# Patient Record
Sex: FEMALE | Race: WHITE | Employment: OTHER | URBAN - METROPOLITAN AREA
[De-identification: names, ages, dates, MRNs, and addresses within clinical notes are randomized per-mention and may not be internally consistent; named-entity substitution may affect disease eponyms.]

---

## 2017-10-06 PROBLEM — H91.93 BILATERAL HEARING LOSS: Chronic | Status: ACTIVE | Noted: 2017-10-06

## 2017-10-06 PROBLEM — E11.36 TYPE 2 DIABETES MELLITUS WITH DIABETIC CATARACT, WITHOUT LONG-TERM CURRENT USE OF INSULIN (HCC): Chronic | Status: ACTIVE | Noted: 2017-10-06

## 2017-10-06 PROBLEM — F51.01 PRIMARY INSOMNIA: Chronic | Status: ACTIVE | Noted: 2017-10-06

## 2017-10-06 PROBLEM — F41.9 ANXIETY: Chronic | Status: ACTIVE | Noted: 2017-10-06

## 2017-12-01 ENCOUNTER — HOSPITAL ENCOUNTER (OUTPATIENT)
Dept: MRI IMAGING | Age: 82
Discharge: HOME | End: 2017-12-01
Attending: OTOLARYNGOLOGY
Payer: MEDICARE

## 2017-12-01 VITALS — WEIGHT: 126 LBS | BODY MASS INDEX: 23.42 KG/M2

## 2017-12-01 DIAGNOSIS — H91.23 SUDDEN HEARING LOSS OF BOTH EARS: ICD-10-CM

## 2017-12-01 LAB — CREAT BLD-MCNC: 1.1 MG/DL (ref 0.8–1.5)

## 2017-12-01 PROCEDURE — 70553 MRI BRAIN STEM W/O & W/DYE: CPT

## 2017-12-01 PROCEDURE — 74011250636 HC RX REV CODE- 250/636: Performed by: OTOLARYNGOLOGY

## 2017-12-01 PROCEDURE — A9577 INJ MULTIHANCE: HCPCS | Performed by: OTOLARYNGOLOGY

## 2017-12-01 PROCEDURE — 82565 ASSAY OF CREATININE: CPT

## 2017-12-01 RX ORDER — SODIUM CHLORIDE 0.9 % (FLUSH) 0.9 %
10 SYRINGE (ML) INJECTION
Status: COMPLETED | OUTPATIENT
Start: 2017-12-01 | End: 2017-12-01

## 2017-12-01 RX ADMIN — Medication 10 ML: at 16:20

## 2017-12-01 RX ADMIN — GADOBENATE DIMEGLUMINE 12 ML: 529 INJECTION, SOLUTION INTRAVENOUS at 16:20

## 2018-04-03 PROBLEM — E11.21 TYPE 2 DIABETES WITH NEPHROPATHY (HCC): Chronic | Status: ACTIVE | Noted: 2018-04-03

## 2018-04-03 PROBLEM — F32.1 MODERATE MAJOR DEPRESSION (HCC): Chronic | Status: ACTIVE | Noted: 2018-04-03

## 2018-04-03 PROBLEM — F32.1 MODERATE MAJOR DEPRESSION (HCC): Status: ACTIVE | Noted: 2018-04-03

## 2018-04-03 PROBLEM — E11.21 TYPE 2 DIABETES WITH NEPHROPATHY (HCC): Status: ACTIVE | Noted: 2018-04-03

## 2020-09-30 PROBLEM — E11.36 TYPE 2 DIABETES MELLITUS WITH DIABETIC CATARACT, WITHOUT LONG-TERM CURRENT USE OF INSULIN (HCC): Chronic | Status: RESOLVED | Noted: 2017-10-06 | Resolved: 2020-09-30

## 2021-04-14 PROBLEM — E11.21 TYPE 2 DIABETES WITH NEPHROPATHY (HCC): Chronic | Status: RESOLVED | Noted: 2018-04-03 | Resolved: 2021-04-14

## 2022-03-19 PROBLEM — F51.01 PRIMARY INSOMNIA: Status: ACTIVE | Noted: 2017-10-06

## 2022-03-19 PROBLEM — H91.93 BILATERAL HEARING LOSS: Status: ACTIVE | Noted: 2017-10-06

## 2022-03-19 PROBLEM — F32.1 MODERATE MAJOR DEPRESSION (HCC): Status: ACTIVE | Noted: 2018-04-03

## 2022-03-20 PROBLEM — F41.9 ANXIETY: Status: ACTIVE | Noted: 2017-10-06

## 2022-06-09 ENCOUNTER — PROCEDURE VISIT (OUTPATIENT)
Dept: ENT CLINIC | Age: 87
End: 2022-06-09

## 2022-06-09 DIAGNOSIS — Z97.4 USES HEARING AID: Primary | ICD-10-CM

## 2022-06-09 NOTE — PROGRESS NOTES
Patient's daughter brought in the patient's hearing aids stating they are not working. Cleaned and checked both aids. Changed the wax filter and cleaned the mics on both aids. A listening check revealed that the aids are functioning correctly at this time. There were no other concerns reported at this time.    Elliott Miles, Misty CCC-A

## 2022-10-20 DIAGNOSIS — I10 ESSENTIAL (PRIMARY) HYPERTENSION: ICD-10-CM

## 2022-10-20 RX ORDER — ATENOLOL 25 MG/1
TABLET ORAL
Qty: 90 TABLET | Refills: 1 | OUTPATIENT
Start: 2022-10-20

## 2022-10-24 ENCOUNTER — NURSE ONLY (OUTPATIENT)
Dept: FAMILY MEDICINE CLINIC | Facility: CLINIC | Age: 87
End: 2022-10-24

## 2022-10-24 DIAGNOSIS — E11.9 TYPE 2 DIABETES MELLITUS WITHOUT COMPLICATION, UNSPECIFIED WHETHER LONG TERM INSULIN USE (HCC): Primary | ICD-10-CM

## 2022-10-24 DIAGNOSIS — E11.9 TYPE 2 DIABETES MELLITUS WITHOUT COMPLICATION, UNSPECIFIED WHETHER LONG TERM INSULIN USE (HCC): ICD-10-CM

## 2022-10-24 LAB
BASOPHILS # BLD: 0.1 K/UL (ref 0–0.2)
BASOPHILS NFR BLD: 1 % (ref 0–2)
DIFFERENTIAL METHOD BLD: ABNORMAL
EOSINOPHIL # BLD: 0.1 K/UL (ref 0–0.8)
EOSINOPHIL NFR BLD: 3 % (ref 0.5–7.8)
ERYTHROCYTE [DISTWIDTH] IN BLOOD BY AUTOMATED COUNT: 13 % (ref 11.9–14.6)
EST. AVERAGE GLUCOSE BLD GHB EST-MCNC: 131 MG/DL
HBA1C MFR BLD: 6.2 % (ref 4.8–5.6)
HCT VFR BLD AUTO: 46.1 % (ref 35.8–46.3)
HGB BLD-MCNC: 15.2 G/DL (ref 11.7–15.4)
IMM GRANULOCYTES # BLD AUTO: 0 K/UL (ref 0–0.5)
IMM GRANULOCYTES NFR BLD AUTO: 1 % (ref 0–5)
LYMPHOCYTES # BLD: 1 K/UL (ref 0.5–4.6)
LYMPHOCYTES NFR BLD: 20 % (ref 13–44)
MCH RBC QN AUTO: 30.3 PG (ref 26.1–32.9)
MCHC RBC AUTO-ENTMCNC: 33 G/DL (ref 31.4–35)
MCV RBC AUTO: 92 FL (ref 82–102)
MONOCYTES # BLD: 0.7 K/UL (ref 0.1–1.3)
MONOCYTES NFR BLD: 13 % (ref 4–12)
NEUTS SEG # BLD: 3.3 K/UL (ref 1.7–8.2)
NEUTS SEG NFR BLD: 62 % (ref 43–78)
NRBC # BLD: 0 K/UL (ref 0–0.2)
PLATELET # BLD AUTO: 137 K/UL (ref 150–450)
PMV BLD AUTO: 10.2 FL (ref 9.4–12.3)
RBC # BLD AUTO: 5.01 M/UL (ref 4.05–5.2)
WBC # BLD AUTO: 5.1 K/UL (ref 4.3–11.1)

## 2022-10-25 LAB
ALBUMIN SERPL-MCNC: 4.1 G/DL (ref 3.2–4.6)
ALBUMIN/GLOB SERPL: 1.4 {RATIO} (ref 0.4–1.6)
ALP SERPL-CCNC: 68 U/L (ref 50–136)
ALT SERPL-CCNC: 24 U/L (ref 12–65)
ANION GAP SERPL CALC-SCNC: 6 MMOL/L (ref 2–11)
AST SERPL-CCNC: 18 U/L (ref 15–37)
BILIRUB SERPL-MCNC: 0.9 MG/DL (ref 0.2–1.1)
BUN SERPL-MCNC: 20 MG/DL (ref 8–23)
CALCIUM SERPL-MCNC: 9.8 MG/DL (ref 8.3–10.4)
CHLORIDE SERPL-SCNC: 103 MMOL/L (ref 101–110)
CHOLEST SERPL-MCNC: 102 MG/DL
CO2 SERPL-SCNC: 30 MMOL/L (ref 21–32)
CREAT SERPL-MCNC: 1.1 MG/DL (ref 0.6–1)
GLOBULIN SER CALC-MCNC: 2.9 G/DL (ref 2.8–4.5)
GLUCOSE SERPL-MCNC: 131 MG/DL (ref 65–100)
HDLC SERPL-MCNC: 37 MG/DL (ref 40–60)
HDLC SERPL: 2.8 {RATIO}
LDLC SERPL CALC-MCNC: 21.2 MG/DL
POTASSIUM SERPL-SCNC: 3.3 MMOL/L (ref 3.5–5.1)
PROT SERPL-MCNC: 7 G/DL (ref 6.3–8.2)
SODIUM SERPL-SCNC: 139 MMOL/L (ref 133–143)
TRIGL SERPL-MCNC: 219 MG/DL (ref 35–150)
VLDLC SERPL CALC-MCNC: 43.8 MG/DL (ref 6–23)

## 2022-10-27 ENCOUNTER — OFFICE VISIT (OUTPATIENT)
Dept: FAMILY MEDICINE CLINIC | Facility: CLINIC | Age: 87
End: 2022-10-27
Payer: MEDICARE

## 2022-10-27 VITALS
TEMPERATURE: 97 F | BODY MASS INDEX: 25.03 KG/M2 | HEART RATE: 76 BPM | OXYGEN SATURATION: 95 % | HEIGHT: 62 IN | SYSTOLIC BLOOD PRESSURE: 122 MMHG | WEIGHT: 136 LBS | DIASTOLIC BLOOD PRESSURE: 80 MMHG

## 2022-10-27 DIAGNOSIS — N18.30 STAGE 3 CHRONIC KIDNEY DISEASE, UNSPECIFIED WHETHER STAGE 3A OR 3B CKD (HCC): ICD-10-CM

## 2022-10-27 DIAGNOSIS — I10 ESSENTIAL (PRIMARY) HYPERTENSION: ICD-10-CM

## 2022-10-27 DIAGNOSIS — E11.9 TYPE 2 DIABETES MELLITUS WITHOUT COMPLICATION, UNSPECIFIED WHETHER LONG TERM INSULIN USE (HCC): Primary | ICD-10-CM

## 2022-10-27 DIAGNOSIS — F51.01 PRIMARY INSOMNIA: ICD-10-CM

## 2022-10-27 DIAGNOSIS — E78.2 MIXED HYPERLIPIDEMIA: ICD-10-CM

## 2022-10-27 DIAGNOSIS — Z23 NEED FOR IMMUNIZATION AGAINST INFLUENZA: ICD-10-CM

## 2022-10-27 PROCEDURE — 90694 VACC AIIV4 NO PRSRV 0.5ML IM: CPT | Performed by: STUDENT IN AN ORGANIZED HEALTH CARE EDUCATION/TRAINING PROGRAM

## 2022-10-27 PROCEDURE — G8417 CALC BMI ABV UP PARAM F/U: HCPCS | Performed by: STUDENT IN AN ORGANIZED HEALTH CARE EDUCATION/TRAINING PROGRAM

## 2022-10-27 PROCEDURE — G8484 FLU IMMUNIZE NO ADMIN: HCPCS | Performed by: STUDENT IN AN ORGANIZED HEALTH CARE EDUCATION/TRAINING PROGRAM

## 2022-10-27 PROCEDURE — 1123F ACP DISCUSS/DSCN MKR DOCD: CPT | Performed by: STUDENT IN AN ORGANIZED HEALTH CARE EDUCATION/TRAINING PROGRAM

## 2022-10-27 PROCEDURE — 99214 OFFICE O/P EST MOD 30 MIN: CPT | Performed by: STUDENT IN AN ORGANIZED HEALTH CARE EDUCATION/TRAINING PROGRAM

## 2022-10-27 PROCEDURE — 1090F PRES/ABSN URINE INCON ASSESS: CPT | Performed by: STUDENT IN AN ORGANIZED HEALTH CARE EDUCATION/TRAINING PROGRAM

## 2022-10-27 PROCEDURE — 3044F HG A1C LEVEL LT 7.0%: CPT | Performed by: STUDENT IN AN ORGANIZED HEALTH CARE EDUCATION/TRAINING PROGRAM

## 2022-10-27 PROCEDURE — G8427 DOCREV CUR MEDS BY ELIG CLIN: HCPCS | Performed by: STUDENT IN AN ORGANIZED HEALTH CARE EDUCATION/TRAINING PROGRAM

## 2022-10-27 PROCEDURE — G0008 ADMIN INFLUENZA VIRUS VAC: HCPCS | Performed by: STUDENT IN AN ORGANIZED HEALTH CARE EDUCATION/TRAINING PROGRAM

## 2022-10-27 PROCEDURE — 1036F TOBACCO NON-USER: CPT | Performed by: STUDENT IN AN ORGANIZED HEALTH CARE EDUCATION/TRAINING PROGRAM

## 2022-10-27 RX ORDER — MONTELUKAST SODIUM 4 MG/1
1 TABLET, CHEWABLE ORAL DAILY
Qty: 30 TABLET | Refills: 5 | Status: SHIPPED | OUTPATIENT
Start: 2022-10-27

## 2022-10-27 RX ORDER — OMEPRAZOLE 20 MG/1
20 CAPSULE, DELAYED RELEASE ORAL DAILY
Qty: 30 CAPSULE | Refills: 5 | Status: SHIPPED | OUTPATIENT
Start: 2022-10-27

## 2022-10-27 RX ORDER — LOSARTAN POTASSIUM AND HYDROCHLOROTHIAZIDE 12.5; 1 MG/1; MG/1
1 TABLET ORAL DAILY
Qty: 30 TABLET | Refills: 5 | Status: SHIPPED | OUTPATIENT
Start: 2022-10-27

## 2022-10-27 RX ORDER — ATENOLOL 25 MG/1
TABLET ORAL
Qty: 30 TABLET | Refills: 5 | Status: SHIPPED | OUTPATIENT
Start: 2022-10-27

## 2022-10-27 RX ORDER — POTASSIUM CHLORIDE 750 MG/1
10 TABLET, FILM COATED, EXTENDED RELEASE ORAL DAILY
Qty: 30 TABLET | Refills: 5 | Status: SHIPPED | OUTPATIENT
Start: 2022-10-27

## 2022-10-27 RX ORDER — SIMVASTATIN 40 MG
TABLET ORAL
Qty: 30 TABLET | Refills: 5 | Status: SHIPPED | OUTPATIENT
Start: 2022-10-27

## 2022-10-27 RX ORDER — AMITRIPTYLINE HYDROCHLORIDE 25 MG/1
12.5 TABLET, FILM COATED ORAL NIGHTLY
Qty: 30 TABLET | Refills: 5 | Status: SHIPPED | OUTPATIENT
Start: 2022-10-27

## 2022-10-27 RX ORDER — CITALOPRAM 20 MG/1
20 TABLET ORAL NIGHTLY
Qty: 30 TABLET | Refills: 5 | Status: SHIPPED | OUTPATIENT
Start: 2022-10-27

## 2022-10-27 NOTE — PROGRESS NOTES
[facility], [address]  Phone [phone]  Fax:  [phone]    Aline Zheng (:  1935) is a 80 y.o. female here for evaluation of the following chief complaint(s):  Cholesterol Problem, Hypertension, Gastroesophageal Reflux, Insomnia (Review labs refills), and Flu Vaccine       ASSESSMENT/PLAN:  1. Type 2 diabetes mellitus without complication, unspecified whether long term insulin use (HCC)  2. Stage 3 chronic kidney disease, unspecified whether stage 3a or 3b CKD ([facility] [address]  3. Essential (primary) hypertension  4. Mixed hyperlipidemia  5. Primary insomnia  6. Need for immunization against influenza  -     Influenza, FLUAD, (age 72 y+), IM, PF, 0.5 mL    Blood pressure well controlled, continue losartan-HCTZ and atenolol. Continue simvastatin for HLD. Continue nightly low-dose amitriptyline for insomnia, this has worked well for patient. Mood stable on Celexa. Recent hemoglobin A1c stable at 6.2, diabetes diet controlled. CBC showed stable mild thrombocytopenia with platelets of 140. Appears to have some mild CKD, stable. Return in about 6 months (around 2023) for routine f/u, labs prior. Subjective   SUBJECTIVE/OBJECTIVE:  HPI  59-year-old female with PMH of HTN, HLD, DM2, GERD, depression, and thrombocytopenia who presents for regular 6-month follow-up. -Doing well  -Sometimes feels a little unsteady on her feet    Review of Systems       Objective     Vitals:    10/27/22 1105   BP: 122/80   Pulse: 76   Temp: 97 °F (36.1 °C)   SpO2: 95%       Physical Exam  Vitals reviewed. Constitutional:       General: She is not in acute distress. Appearance: She is normal weight. HENT:      Head: Normocephalic and atraumatic. Cardiovascular:      Rate and Rhythm: Normal rate and regular rhythm. Pulmonary:      Effort: Pulmonary effort is normal.      Breath sounds: Normal breath sounds. Musculoskeletal:      Right lower leg: No edema. Left lower leg: No edema. Skin:     General: Skin is warm and dry. Neurological:      General: No focal deficit present. Mental Status: She is alert and oriented to person, place, and time. Comments: Normal gait  Negative Romberg              An electronic signature was used to authenticate this note.     --Radha Olvera MD

## 2023-01-22 DIAGNOSIS — K21.00 GASTRO-ESOPHAGEAL REFLUX DISEASE WITH ESOPHAGITIS, WITHOUT BLEEDING: ICD-10-CM

## 2023-01-23 RX ORDER — OMEPRAZOLE 20 MG/1
CAPSULE, DELAYED RELEASE ORAL
Qty: 180 CAPSULE | Refills: 1 | OUTPATIENT
Start: 2023-01-23

## 2023-02-06 ENCOUNTER — OFFICE VISIT (OUTPATIENT)
Dept: FAMILY MEDICINE CLINIC | Facility: CLINIC | Age: 88
End: 2023-02-06
Payer: MEDICARE

## 2023-02-06 VITALS
TEMPERATURE: 97.2 F | DIASTOLIC BLOOD PRESSURE: 76 MMHG | HEIGHT: 61 IN | HEART RATE: 84 BPM | BODY MASS INDEX: 24.39 KG/M2 | OXYGEN SATURATION: 99 % | WEIGHT: 129.2 LBS | SYSTOLIC BLOOD PRESSURE: 142 MMHG

## 2023-02-06 DIAGNOSIS — S80.812A ABRASION OF ANTERIOR LEFT LOWER LEG, INITIAL ENCOUNTER: Primary | ICD-10-CM

## 2023-02-06 DIAGNOSIS — Z91.81 AT HIGH RISK FOR FALLS: ICD-10-CM

## 2023-02-06 PROBLEM — E11.9 TYPE 2 DIABETES MELLITUS (HCC): Status: ACTIVE | Noted: 2023-02-06

## 2023-02-06 PROBLEM — N18.30 STAGE 3 CHRONIC KIDNEY DISEASE, UNSPECIFIED WHETHER STAGE 3A OR 3B CKD (HCC): Status: ACTIVE | Noted: 2023-02-06

## 2023-02-06 PROCEDURE — G8420 CALC BMI NORM PARAMETERS: HCPCS | Performed by: FAMILY MEDICINE

## 2023-02-06 PROCEDURE — G8484 FLU IMMUNIZE NO ADMIN: HCPCS | Performed by: FAMILY MEDICINE

## 2023-02-06 PROCEDURE — 1090F PRES/ABSN URINE INCON ASSESS: CPT | Performed by: FAMILY MEDICINE

## 2023-02-06 PROCEDURE — 1123F ACP DISCUSS/DSCN MKR DOCD: CPT | Performed by: FAMILY MEDICINE

## 2023-02-06 PROCEDURE — 99213 OFFICE O/P EST LOW 20 MIN: CPT | Performed by: FAMILY MEDICINE

## 2023-02-06 PROCEDURE — 1036F TOBACCO NON-USER: CPT | Performed by: FAMILY MEDICINE

## 2023-02-06 PROCEDURE — G8427 DOCREV CUR MEDS BY ELIG CLIN: HCPCS | Performed by: FAMILY MEDICINE

## 2023-02-06 ASSESSMENT — ANXIETY QUESTIONNAIRES
3. WORRYING TOO MUCH ABOUT DIFFERENT THINGS: 0
6. BECOMING EASILY ANNOYED OR IRRITABLE: 0
4. TROUBLE RELAXING: 0
1. FEELING NERVOUS, ANXIOUS, OR ON EDGE: 0
5. BEING SO RESTLESS THAT IT IS HARD TO SIT STILL: 0
GAD7 TOTAL SCORE: 0
IF YOU CHECKED OFF ANY PROBLEMS ON THIS QUESTIONNAIRE, HOW DIFFICULT HAVE THESE PROBLEMS MADE IT FOR YOU TO DO YOUR WORK, TAKE CARE OF THINGS AT HOME, OR GET ALONG WITH OTHER PEOPLE: NOT DIFFICULT AT ALL
2. NOT BEING ABLE TO STOP OR CONTROL WORRYING: 0
7. FEELING AFRAID AS IF SOMETHING AWFUL MIGHT HAPPEN: 0

## 2023-02-06 ASSESSMENT — ENCOUNTER SYMPTOMS: SHORTNESS OF BREATH: 0

## 2023-02-06 ASSESSMENT — PATIENT HEALTH QUESTIONNAIRE - PHQ9
2. FEELING DOWN, DEPRESSED OR HOPELESS: 0
SUM OF ALL RESPONSES TO PHQ QUESTIONS 1-9: 0
9. THOUGHTS THAT YOU WOULD BE BETTER OFF DEAD, OR OF HURTING YOURSELF: 0
7. TROUBLE CONCENTRATING ON THINGS, SUCH AS READING THE NEWSPAPER OR WATCHING TELEVISION: 0
SUM OF ALL RESPONSES TO PHQ9 QUESTIONS 1 & 2: 0
SUM OF ALL RESPONSES TO PHQ QUESTIONS 1-9: 0
5. POOR APPETITE OR OVEREATING: 0
10. IF YOU CHECKED OFF ANY PROBLEMS, HOW DIFFICULT HAVE THESE PROBLEMS MADE IT FOR YOU TO DO YOUR WORK, TAKE CARE OF THINGS AT HOME, OR GET ALONG WITH OTHER PEOPLE: 0
4. FEELING TIRED OR HAVING LITTLE ENERGY: 0
SUM OF ALL RESPONSES TO PHQ QUESTIONS 1-9: 0
6. FEELING BAD ABOUT YOURSELF - OR THAT YOU ARE A FAILURE OR HAVE LET YOURSELF OR YOUR FAMILY DOWN: 0
SUM OF ALL RESPONSES TO PHQ QUESTIONS 1-9: 0
3. TROUBLE FALLING OR STAYING ASLEEP: 0
1. LITTLE INTEREST OR PLEASURE IN DOING THINGS: 0
8. MOVING OR SPEAKING SO SLOWLY THAT OTHER PEOPLE COULD HAVE NOTICED. OR THE OPPOSITE, BEING SO FIGETY OR RESTLESS THAT YOU HAVE BEEN MOVING AROUND A LOT MORE THAN USUAL: 0

## 2023-02-06 NOTE — PROGRESS NOTES
[facility]  [address]  Phone [phone]  Fax:  [phone]    Patient: Karen Herrera  YOB: 1935  Patient Age 80 y.o. Patient sex: female  Medical Record:  [id number]  Visit Date: 02/06/23    [facility] Note     Chief Complaint   Patient presents with    Other     Pt fell down her steps an hurt her left arm an left leg on Friday an she wanted to have it checked out        History of Present Illness:  Pt had a fall at her house. Odalys Espinozach down her steps - 2 steps. Slid off the top one. Happened on Friday. Put on ice. L leg abrasion and L arm abrasion. She did not feel weak or dizzy. She did not have a syncopal episode. When she fell she did not hit her head. Most of everything ended up on her left side. She does have abrasions on her left leg and left arm. There are no abrasions on her head. She denies that she hit her head. Currently she is trying to keep her leg elevated. She has put ice on the large abrasion on her left leg. Other  Pertinent negatives include no fever, headaches or weakness. Allergies:   Allergies   Allergen Reactions    Ace Inhibitors Other (See Comments)    Amlodipine Other (See Comments)     GI    Amoxicillin Other (See Comments)    Codeine Other (See Comments)    Erythromycin Other (See Comments)    Sulfa Antibiotics Other (See Comments)       Current Medications:   Medications marked \"taking\" at this time:    Current Outpatient Medications:     amitriptyline (ELAVIL) 25 MG tablet, Take 0.5 tablets by mouth nightly, Disp: 30 tablet, Rfl: 5    atenolol (TENORMIN) 25 MG tablet, TAKE 1/2 TABLET BY MOUTH 2 TIMES DAILY, Disp: 30 tablet, Rfl: 5    citalopram (CELEXA) 20 MG tablet, Take 1 tablet by mouth at bedtime TAKE 1 & 1/2 TABLETS BY MOUTH AT BEDTIME, Disp: 30 tablet, Rfl: 5    colestipol (COLESTID) 1 g tablet, Take 1 tablet by mouth daily, Disp: 30 tablet, Rfl: 5    losartan-hydroCHLOROthiazide (HYZAAR) 100-12.5 MG per tablet, Take 1 tablet by mouth daily, Disp: 30 tablet, Rfl: 5    omeprazole (PRILOSEC) 20 MG delayed release capsule, Take 1 capsule by mouth Daily, Disp: 30 capsule, Rfl: 5    potassium chloride (KLOR-CON) 10 MEQ extended release tablet, Take 1 tablet by mouth daily, Disp: 30 tablet, Rfl: 5    simvastatin (ZOCOR) 40 MG tablet, TAKE 1/2 TABLET BY MOUTH EVERY EVENING, Disp: 30 tablet, Rfl: 5    Lancets MISC, Check blood sugars BID. E11.9, Disp: , Rfl:     aspirin 81 MG EC tablet, Take by mouth daily, Disp: , Rfl:     latanoprost (XALATAN) 0.005 % ophthalmic solution, PUT 1 DROP INTO BOTH EYES AT BEDTIME, Disp: , Rfl:     timolol (TIMOPTIC-XE) 0.5 % ophthalmic gel-forming, Apply 1 drop to eye daily, Disp: , Rfl:     Current Problem List:   Patient Active Problem List   Diagnosis    Reflux esophagitis    Primary insomnia    Moderate major depression (HCC)    Rosacea    Irritable bowel syndrome    Essential hypertension    Hyperlipidemia with target LDL less than 100    Bilateral hearing loss    Anxiety    Stage 3 chronic kidney disease, unspecified whether stage 3a or 3b CKD (HCC)    Type 2 diabetes mellitus       Social History:   Social History     Tobacco Use    Smoking status: Never    Smokeless tobacco: Never   Substance Use Topics    Alcohol use: No     Alcohol/week: 0.0 standard drinks       Family History:   Family History   Problem Relation Age of Onset    Hypertension Neg Hx     Cancer Mother     No Known Problems Paternal Grandfather     No Known Problems Paternal Grandmother     No Known Problems Maternal Grandfather     No Known Problems Maternal Grandmother     Heart Disease Father     Diabetes Father        Surgical History:  Past Surgical History:   Procedure Laterality Date    [address] [facility]  2004    CATARACT REMOVAL Bilateral 2016    CHOLECYSTECTOMY  1965    COLONOSCOPY  04/03/2013    repeat in 5 years     HEENT      TUBAL LIGATION         ROS  Review of Systems   Constitutional:  Negative for fever. Respiratory:  Negative for shortness of breath. Skin:  Positive for wound. Left leg abrasion   Neurological:  Negative for dizziness, weakness, light-headedness and headaches. Visit Vitals  BP (!) 142/76   Pulse 84   Temp 97.2 °F (36.2 °C)   Ht 5' 1\" (1.549 m)   Wt 129 lb 3.2 oz (58.6 kg)   SpO2 99%   BMI 24.41 kg/m²       Physical Exam  Physical Exam  Constitutional:       Appearance: Normal appearance. She is normal weight. Cardiovascular:      Rate and Rhythm: Normal rate and regular rhythm. Pulses: Normal pulses. Heart sounds: Normal heart sounds. Pulmonary:      Effort: Pulmonary effort is normal.      Breath sounds: Normal breath sounds. Musculoskeletal:      Cervical back: Normal range of motion and neck supple. Skin:     General: Skin is warm. Findings: Bruising and lesion present. Comments: Large left leg abrasion over anterior tibia. There is swelling and tenderness mid lower leg. With large swollen ecchymotic area that is soft. She does have other minor abrasions down the leg. There is no increased erythema or drainage. He also has some smaller abrasions on her left hand. Neurological:      Mental Status: She is alert. ASSESSMENT & PLAN      I have reviewed the patient's past medical history, social history and family history and vitals. We have discussed treatment plan and follow up and given patient instructions. Patient's questions are answered and we will follow up as indicated. Alfred was seen today for fall and abrasions on her leg. Diagnoses and all orders for this visit:    Abrasion of anterior left lower leg, initial encounter -discussed with patient to wash her abrasions daily with soap and water. Recommend covering them with either bacitracin or some Vaseline and then covering them with Band-Aids. I did wrap her leg with a Telfa pad and some Coban.   I did give her samples of what to  at the drugstore. She will follow-up with us on Friday just to check to see how she is doing and make sure there is no infection in her leg. At high risk for falls -discussed falls with the patient. It was just a simple step upon which she slipped outside. Discussed need to hang on to any handrails that are available. Suggest some hand railings on any steps that are at her house. Return in about 4 days (around 2/10/2023) for Wound f/u.          Grisel Leiva MD

## 2023-02-10 ENCOUNTER — OFFICE VISIT (OUTPATIENT)
Dept: FAMILY MEDICINE CLINIC | Facility: CLINIC | Age: 88
End: 2023-02-10
Payer: MEDICARE

## 2023-02-10 VITALS
HEIGHT: 61 IN | OXYGEN SATURATION: 95 % | TEMPERATURE: 97.2 F | DIASTOLIC BLOOD PRESSURE: 82 MMHG | WEIGHT: 129.9 LBS | HEART RATE: 77 BPM | BODY MASS INDEX: 24.52 KG/M2 | SYSTOLIC BLOOD PRESSURE: 144 MMHG

## 2023-02-10 DIAGNOSIS — L03.116 CELLULITIS OF LEFT ANTERIOR LOWER LEG: Primary | ICD-10-CM

## 2023-02-10 PROCEDURE — 1123F ACP DISCUSS/DSCN MKR DOCD: CPT | Performed by: FAMILY MEDICINE

## 2023-02-10 PROCEDURE — 99213 OFFICE O/P EST LOW 20 MIN: CPT | Performed by: FAMILY MEDICINE

## 2023-02-10 PROCEDURE — 1036F TOBACCO NON-USER: CPT | Performed by: FAMILY MEDICINE

## 2023-02-10 PROCEDURE — G8484 FLU IMMUNIZE NO ADMIN: HCPCS | Performed by: FAMILY MEDICINE

## 2023-02-10 PROCEDURE — 1090F PRES/ABSN URINE INCON ASSESS: CPT | Performed by: FAMILY MEDICINE

## 2023-02-10 PROCEDURE — G8427 DOCREV CUR MEDS BY ELIG CLIN: HCPCS | Performed by: FAMILY MEDICINE

## 2023-02-10 PROCEDURE — G8420 CALC BMI NORM PARAMETERS: HCPCS | Performed by: FAMILY MEDICINE

## 2023-02-10 RX ORDER — DOXYCYCLINE HYCLATE 100 MG
100 TABLET ORAL 2 TIMES DAILY
Qty: 14 TABLET | Refills: 0 | Status: SHIPPED | OUTPATIENT
Start: 2023-02-10 | End: 2023-02-17

## 2023-02-10 SDOH — ECONOMIC STABILITY: FOOD INSECURITY: WITHIN THE PAST 12 MONTHS, YOU WORRIED THAT YOUR FOOD WOULD RUN OUT BEFORE YOU GOT MONEY TO BUY MORE.: NEVER TRUE

## 2023-02-10 SDOH — ECONOMIC STABILITY: INCOME INSECURITY: HOW HARD IS IT FOR YOU TO PAY FOR THE VERY BASICS LIKE FOOD, HOUSING, MEDICAL CARE, AND HEATING?: NOT HARD AT ALL

## 2023-02-10 SDOH — ECONOMIC STABILITY: HOUSING INSECURITY
IN THE LAST 12 MONTHS, WAS THERE A TIME WHEN YOU DID NOT HAVE A STEADY PLACE TO SLEEP OR SLEPT IN A SHELTER (INCLUDING NOW)?: NO

## 2023-02-10 SDOH — ECONOMIC STABILITY: FOOD INSECURITY: WITHIN THE PAST 12 MONTHS, THE FOOD YOU BOUGHT JUST DIDN'T LAST AND YOU DIDN'T HAVE MONEY TO GET MORE.: NEVER TRUE

## 2023-02-10 ASSESSMENT — PATIENT HEALTH QUESTIONNAIRE - PHQ9
3. TROUBLE FALLING OR STAYING ASLEEP: 0
SUM OF ALL RESPONSES TO PHQ QUESTIONS 1-9: 0
SUM OF ALL RESPONSES TO PHQ QUESTIONS 1-9: 0
9. THOUGHTS THAT YOU WOULD BE BETTER OFF DEAD, OR OF HURTING YOURSELF: 0
SUM OF ALL RESPONSES TO PHQ QUESTIONS 1-9: 0
5. POOR APPETITE OR OVEREATING: 0
2. FEELING DOWN, DEPRESSED OR HOPELESS: 0
1. LITTLE INTEREST OR PLEASURE IN DOING THINGS: 0
SUM OF ALL RESPONSES TO PHQ9 QUESTIONS 1 & 2: 0
7. TROUBLE CONCENTRATING ON THINGS, SUCH AS READING THE NEWSPAPER OR WATCHING TELEVISION: 0
8. MOVING OR SPEAKING SO SLOWLY THAT OTHER PEOPLE COULD HAVE NOTICED. OR THE OPPOSITE, BEING SO FIGETY OR RESTLESS THAT YOU HAVE BEEN MOVING AROUND A LOT MORE THAN USUAL: 0
4. FEELING TIRED OR HAVING LITTLE ENERGY: 0
6. FEELING BAD ABOUT YOURSELF - OR THAT YOU ARE A FAILURE OR HAVE LET YOURSELF OR YOUR FAMILY DOWN: 0
SUM OF ALL RESPONSES TO PHQ QUESTIONS 1-9: 0
10. IF YOU CHECKED OFF ANY PROBLEMS, HOW DIFFICULT HAVE THESE PROBLEMS MADE IT FOR YOU TO DO YOUR WORK, TAKE CARE OF THINGS AT HOME, OR GET ALONG WITH OTHER PEOPLE: 0

## 2023-02-10 ASSESSMENT — ANXIETY QUESTIONNAIRES
2. NOT BEING ABLE TO STOP OR CONTROL WORRYING: 0
7. FEELING AFRAID AS IF SOMETHING AWFUL MIGHT HAPPEN: 0
6. BECOMING EASILY ANNOYED OR IRRITABLE: 0
5. BEING SO RESTLESS THAT IT IS HARD TO SIT STILL: 0
1. FEELING NERVOUS, ANXIOUS, OR ON EDGE: 0
3. WORRYING TOO MUCH ABOUT DIFFERENT THINGS: 0
4. TROUBLE RELAXING: 0
IF YOU CHECKED OFF ANY PROBLEMS ON THIS QUESTIONNAIRE, HOW DIFFICULT HAVE THESE PROBLEMS MADE IT FOR YOU TO DO YOUR WORK, TAKE CARE OF THINGS AT HOME, OR GET ALONG WITH OTHER PEOPLE: NOT DIFFICULT AT ALL
GAD7 TOTAL SCORE: 0

## 2023-02-10 NOTE — PROGRESS NOTES
[facility]  [address]  Phone [phone]  Fax:  [phone]    Patient: Yeny Hui  YOB: 1935  Patient Age 80 y.o. Patient sex: female  Medical Record:  [id number]  Visit Date: 02/10/23    [facility] Note     Chief Complaint   Patient presents with    Other     Pt fell last Friday outside on the porch on her way to get on [employer] bus. She bruised up her left shin bone an it has some broken skin,redness,swelling       History of Present Illness:  Pt here for f/u on Wound on L anterior shin. Slipped off her step last week and hit her leg. Has become more red per pt. She is still applying ice and trying to keep it elevated. She is washing daily and bandaging daily. Has been taking off bandage to ice. Dicussed ok to leave bandage on to ice. No increased pain. No discharge or bleeding. Other  Pertinent negatives include no chills or fever. Allergies:   Allergies   Allergen Reactions    Ace Inhibitors Other (See Comments)    Amlodipine Other (See Comments)     GI    Amoxicillin Other (See Comments)    Codeine Other (See Comments)    Erythromycin Other (See Comments)    Sulfa Antibiotics Other (See Comments)       Current Medications:   Medications marked \"taking\" at this time:    Current Outpatient Medications:     doxycycline hyclate (VIBRA-TABS) 100 MG tablet, Take 1 tablet by mouth 2 times daily for 7 days, Disp: 14 tablet, Rfl: 0    amitriptyline (ELAVIL) 25 MG tablet, Take 0.5 tablets by mouth nightly, Disp: 30 tablet, Rfl: 5    atenolol (TENORMIN) 25 MG tablet, TAKE 1/2 TABLET BY MOUTH 2 TIMES DAILY, Disp: 30 tablet, Rfl: 5    citalopram (CELEXA) 20 MG tablet, Take 1 tablet by mouth at bedtime TAKE 1 & 1/2 TABLETS BY MOUTH AT BEDTIME, Disp: 30 tablet, Rfl: 5    colestipol (COLESTID) 1 g tablet, Take 1 tablet by mouth daily, Disp: 30 tablet, Rfl: 5    losartan-hydroCHLOROthiazide (HYZAAR) 100-12.5 MG per tablet, Take 1 tablet by mouth daily, Disp: 30 tablet, Rfl: 5    omeprazole (PRILOSEC) 20 MG delayed release capsule, Take 1 capsule by mouth Daily, Disp: 30 capsule, Rfl: 5    potassium chloride (KLOR-CON) 10 MEQ extended release tablet, Take 1 tablet by mouth daily, Disp: 30 tablet, Rfl: 5    simvastatin (ZOCOR) 40 MG tablet, TAKE 1/2 TABLET BY MOUTH EVERY EVENING, Disp: 30 tablet, Rfl: 5    Lancets MISC, Check blood sugars BID. E11.9, Disp: , Rfl:     aspirin 81 MG EC tablet, Take by mouth daily, Disp: , Rfl:     latanoprost (XALATAN) 0.005 % ophthalmic solution, PUT 1 DROP INTO BOTH EYES AT BEDTIME, Disp: , Rfl:     timolol (TIMOPTIC-XE) 0.5 % ophthalmic gel-forming, Apply 1 drop to eye daily, Disp: , Rfl:     Current Problem List:   Patient Active Problem List   Diagnosis    Reflux esophagitis    Primary insomnia    Moderate major depression (HCC)    Rosacea    Irritable bowel syndrome    Essential hypertension    Hyperlipidemia with target LDL less than 100    Bilateral hearing loss    Anxiety    Stage 3 chronic kidney disease, unspecified whether stage 3a or 3b CKD (HCC)    Type 2 diabetes mellitus       Social History:   Social History     Tobacco Use    Smoking status: Never    Smokeless tobacco: Never   Substance Use Topics    Alcohol use: No     Alcohol/week: 0.0 standard drinks       Family History:   Family History   Problem Relation Age of Onset    Hypertension Neg Hx     Cancer Mother     No Known Problems Paternal Grandfather     No Known Problems Paternal Grandmother     No Known Problems Maternal Grandfather     No Known Problems Maternal Grandmother     Heart Disease Father     Diabetes Father        Surgical History:  Past Surgical History:   Procedure Laterality Date    [address] [facility]  2004    CATARACT REMOVAL Bilateral 2016    CHOLECYSTECTOMY  1965    COLONOSCOPY  04/03/2013    repeat in 5 years     HEENT      TUBAL LIGATION         ROS  Review of Systems   Constitutional:  Negative for chills and fever. Skin:  Positive for wound. Visit Vitals  BP (!) 144/82   Pulse 77   Temp 97.2 °F (36.2 °C)   Ht 5' 1\" (1.549 m)   Wt 129 lb 14.4 oz (58.9 kg)   SpO2 95%   BMI 24.54 kg/m²       Physical Exam  Physical Exam  Skin:     Comments: Abrasion and contusion on L anterior Shin   Increased redness/erythema. Swelling has improved. Still some bruising. No drainage. Non tender. ASSESSMENT & PLAN      I have reviewed the patient's past medical history, social history and family history and vitals. We have discussed treatment plan and follow up and given patient instructions. Patient's questions are answered and we will follow up as indicated. Alfred was seen today for other. Diagnoses and all orders for this visit:    Cellulitis of left anterior lower leg - sulfa and amoxicillin allergy - undefined and pt unsure of what rxn. Doxycycline 100 mg bid started. F/u on Monday or tues for recheck of wound. Continue to ice leg and keep elevated. Continue to apply vaseline to wound and wrap leg daily. Wash with soap and water daily. Return in about 3 days (around 2/13/2023) for mon appt - squeeze in if necessary - or tues ok. wound f/u.          Heidi Garces MD

## 2023-02-13 ENCOUNTER — OFFICE VISIT (OUTPATIENT)
Dept: FAMILY MEDICINE CLINIC | Facility: CLINIC | Age: 88
End: 2023-02-13
Payer: MEDICARE

## 2023-02-13 ENCOUNTER — HOSPITAL ENCOUNTER (OUTPATIENT)
Dept: GENERAL RADIOLOGY | Age: 88
Discharge: HOME | End: 2023-02-16
Payer: MEDICARE

## 2023-02-13 VITALS
TEMPERATURE: 97.2 F | BODY MASS INDEX: 24.2 KG/M2 | SYSTOLIC BLOOD PRESSURE: 141 MMHG | DIASTOLIC BLOOD PRESSURE: 84 MMHG | WEIGHT: 128.2 LBS | HEIGHT: 61 IN | OXYGEN SATURATION: 100 % | HEART RATE: 98 BPM

## 2023-02-13 DIAGNOSIS — L03.116 CELLULITIS OF LEFT LOWER EXTREMITY: Primary | ICD-10-CM

## 2023-02-13 DIAGNOSIS — L03.116 CELLULITIS OF LEFT LOWER EXTREMITY: ICD-10-CM

## 2023-02-13 DIAGNOSIS — F32.1 MAJOR DEPRESSIVE DISORDER, SINGLE EPISODE, MODERATE (HCC): ICD-10-CM

## 2023-02-13 PROBLEM — E11.22 TYPE 2 DIABETES MELLITUS WITH CHRONIC KIDNEY DISEASE (HCC): Status: ACTIVE | Noted: 2023-02-13

## 2023-02-13 PROCEDURE — 1123F ACP DISCUSS/DSCN MKR DOCD: CPT | Performed by: FAMILY MEDICINE

## 2023-02-13 PROCEDURE — G8420 CALC BMI NORM PARAMETERS: HCPCS | Performed by: FAMILY MEDICINE

## 2023-02-13 PROCEDURE — 99213 OFFICE O/P EST LOW 20 MIN: CPT | Performed by: FAMILY MEDICINE

## 2023-02-13 PROCEDURE — 1036F TOBACCO NON-USER: CPT | Performed by: FAMILY MEDICINE

## 2023-02-13 PROCEDURE — 73590 X-RAY EXAM OF LOWER LEG: CPT

## 2023-02-13 PROCEDURE — 1090F PRES/ABSN URINE INCON ASSESS: CPT | Performed by: FAMILY MEDICINE

## 2023-02-13 PROCEDURE — G8427 DOCREV CUR MEDS BY ELIG CLIN: HCPCS | Performed by: FAMILY MEDICINE

## 2023-02-13 PROCEDURE — G8484 FLU IMMUNIZE NO ADMIN: HCPCS | Performed by: FAMILY MEDICINE

## 2023-02-13 RX ORDER — CEFUROXIME AXETIL 500 MG/1
500 TABLET ORAL 2 TIMES DAILY
Qty: 14 TABLET | Refills: 0 | Status: SHIPPED | OUTPATIENT
Start: 2023-02-13 | End: 2023-02-20

## 2023-02-13 RX ORDER — CITALOPRAM 20 MG/1
TABLET ORAL
Qty: 45 TABLET | Refills: 3 | OUTPATIENT
Start: 2023-02-13

## 2023-02-13 RX ORDER — MONTELUKAST SODIUM 4 MG/1
1 TABLET, CHEWABLE ORAL DAILY
Qty: 90 TABLET | Refills: 3 | Status: SHIPPED | OUTPATIENT
Start: 2023-02-13

## 2023-02-13 RX ORDER — AMITRIPTYLINE HYDROCHLORIDE 25 MG/1
12.5 TABLET, FILM COATED ORAL NIGHTLY
Qty: 90 TABLET | Refills: 3 | Status: SHIPPED | OUTPATIENT
Start: 2023-02-13

## 2023-02-13 RX ORDER — POTASSIUM CHLORIDE 750 MG/1
10 TABLET, FILM COATED, EXTENDED RELEASE ORAL DAILY
Qty: 90 TABLET | Refills: 3 | Status: SHIPPED | OUTPATIENT
Start: 2023-02-13

## 2023-02-13 ASSESSMENT — PATIENT HEALTH QUESTIONNAIRE - PHQ9
SUM OF ALL RESPONSES TO PHQ QUESTIONS 1-9: 0
1. LITTLE INTEREST OR PLEASURE IN DOING THINGS: 0
SUM OF ALL RESPONSES TO PHQ QUESTIONS 1-9: 0
SUM OF ALL RESPONSES TO PHQ9 QUESTIONS 1 & 2: 0
9. THOUGHTS THAT YOU WOULD BE BETTER OFF DEAD, OR OF HURTING YOURSELF: 0
4. FEELING TIRED OR HAVING LITTLE ENERGY: 0
8. MOVING OR SPEAKING SO SLOWLY THAT OTHER PEOPLE COULD HAVE NOTICED. OR THE OPPOSITE, BEING SO FIGETY OR RESTLESS THAT YOU HAVE BEEN MOVING AROUND A LOT MORE THAN USUAL: 0
7. TROUBLE CONCENTRATING ON THINGS, SUCH AS READING THE NEWSPAPER OR WATCHING TELEVISION: 0
2. FEELING DOWN, DEPRESSED OR HOPELESS: 0
6. FEELING BAD ABOUT YOURSELF - OR THAT YOU ARE A FAILURE OR HAVE LET YOURSELF OR YOUR FAMILY DOWN: 0
5. POOR APPETITE OR OVEREATING: 0
3. TROUBLE FALLING OR STAYING ASLEEP: 0
SUM OF ALL RESPONSES TO PHQ QUESTIONS 1-9: 0
SUM OF ALL RESPONSES TO PHQ QUESTIONS 1-9: 0
10. IF YOU CHECKED OFF ANY PROBLEMS, HOW DIFFICULT HAVE THESE PROBLEMS MADE IT FOR YOU TO DO YOUR WORK, TAKE CARE OF THINGS AT HOME, OR GET ALONG WITH OTHER PEOPLE: 0

## 2023-02-13 NOTE — PROGRESS NOTES
[facility]  [address]  Phone [phone]  Fax:  [phone]    Patient: Darren Parikh  YOB: 1935  Patient Age 80 y.o. Patient sex: female  Medical Record:  [id number]  Visit Date: 02/13/23    [facility] Note     Chief Complaint   Patient presents with    Follow-up     Pt here for follow up for fall she had an hurt her left leg,she has fever an redness still around wound       History of Present Illness:  Pt here for f/u on cellulitis and Wound on L anterior shin. Patient is here with her daughter today. Her daughter thinks the leg is worse. Patient was started on abx last week but apparently did not  the antibiotic. Daughter says that is not the pharmacy that they use. She states sometimes her mother does not hear correctly. So she never started on the antibiotic. The leg has become more painful. She is trying to elevate her leg and stay off of it. Slipped off her step last week and hit her leg. Has become more red per pt. She is still applying ice and trying to keep it elevated. She is washing daily and bandaging daily. No discharge or bleeding. Allergies:   Allergies   Allergen Reactions    Ace Inhibitors Other (See Comments)    Amlodipine Other (See Comments)     GI    Amoxicillin Other (See Comments)    Codeine Other (See Comments)    Erythromycin Other (See Comments)    Sulfa Antibiotics Other (See Comments)       Current Medications:   Medications marked \"taking\" at this time:    Current Outpatient Medications:     cefUROXime (CEFTIN) 500 MG tablet, Take 1 tablet by mouth 2 times daily for 7 days, Disp: 14 tablet, Rfl: 0    amitriptyline (ELAVIL) 25 MG tablet, Take 0.5 tablets by mouth nightly, Disp: 90 tablet, Rfl: 3    colestipol (COLESTID) 1 g tablet, Take 1 tablet by mouth daily, Disp: 90 tablet, Rfl: 3    potassium chloride (KLOR-CON) 10 MEQ extended release tablet, Take 1 tablet by mouth daily, Disp: 90 tablet, Rfl: 3    doxycycline hyclate (VIBRA-TABS) 100 MG tablet, Take 1 tablet by mouth 2 times daily for 7 days, Disp: 14 tablet, Rfl: 0    atenolol (TENORMIN) 25 MG tablet, TAKE 1/2 TABLET BY MOUTH 2 TIMES DAILY, Disp: 30 tablet, Rfl: 5    citalopram (CELEXA) 20 MG tablet, Take 1 tablet by mouth at bedtime TAKE 1 & 1/2 TABLETS BY MOUTH AT BEDTIME, Disp: 30 tablet, Rfl: 5    losartan-hydroCHLOROthiazide (HYZAAR) 100-12.5 MG per tablet, Take 1 tablet by mouth daily, Disp: 30 tablet, Rfl: 5    omeprazole (PRILOSEC) 20 MG delayed release capsule, Take 1 capsule by mouth Daily, Disp: 30 capsule, Rfl: 5    simvastatin (ZOCOR) 40 MG tablet, TAKE 1/2 TABLET BY MOUTH EVERY EVENING, Disp: 30 tablet, Rfl: 5    Lancets MISC, Check blood sugars BID. E11.9, Disp: , Rfl:     aspirin 81 MG EC tablet, Take by mouth daily, Disp: , Rfl:     latanoprost (XALATAN) 0.005 % ophthalmic solution, PUT 1 DROP INTO BOTH EYES AT BEDTIME, Disp: , Rfl:     timolol (TIMOPTIC-XE) 0.5 % ophthalmic gel-forming, Apply 1 drop to eye daily, Disp: , Rfl:     Current Problem List:   Patient Active Problem List   Diagnosis    Reflux esophagitis    Primary insomnia    Moderate major depression (HCC)    Rosacea    Irritable bowel syndrome    Essential hypertension    Hyperlipidemia with target LDL less than 100    Bilateral hearing loss    Anxiety    Stage 3 chronic kidney disease, unspecified whether stage 3a or 3b CKD (HCC)    Type 2 diabetes mellitus    Type 2 diabetes mellitus with chronic kidney disease    Cellulitis of left lower extremity       Social History:   Social History     Tobacco Use    Smoking status: Never    Smokeless tobacco: Never   Substance Use Topics    Alcohol use: No     Alcohol/week: 0.0 standard drinks       Family History:   Family History   Problem Relation Age of Onset    Hypertension Neg Hx     Cancer Mother     No Known Problems Paternal Grandfather     No Known Problems Paternal Grandmother     No Known Problems Maternal Grandfather     No Known Problems Maternal Grandmother     Heart Disease Father     Diabetes Father        Surgical History:  Past Surgical History:   Procedure Laterality Date    APPENDECTOMY  1965    CARDIAC CATHETERIZATION  2004    CATARACT REMOVAL Bilateral 2016    CHOLECYSTECTOMY  1965    COLONOSCOPY  04/03/2013    repeat in 5 years     HEENT      TUBAL LIGATION         ROS  Review of Systems   Skin:  Positive for wound.     Visit Vitals  BP (!) 141/84   Pulse 98   Temp 97.2 °F (36.2 °C)   Ht 5' 1\" (1.549 m)   Wt 128 lb 3.2 oz (58.2 kg)   SpO2 100%   BMI 24.22 kg/m²       Physical Exam  Physical Exam  Skin:     Comments: Left lower leg wound on anterior shin.  No discharge  There is swelling over the shin which has improved since she was seen on Friday.  There is increased tenderness on palpation.  Redness has regressed somewhat.  She does have more ecchymosis around the medial ankle.  There is some increased swelling in the ankle.         ASSESSMENT & PLAN      I have reviewed the patient's past medical history, social history and family history and vitals.  We have discussed treatment plan and follow up and given patient instructions.  Patient's questions are answered and we will follow up as indicated.    Alfred was seen today for follow-up.    Diagnoses and all orders for this visit:    Cellulitis of left lower extremity -patient did not start the antibiotic that was ordered for her.  I had originally started her on Doxy but apparently with her daughter here today they do not know her amoxicillin allergy.  I would prefer to give cefuroxime for her cellulitis.  I did discuss with the daughter that if she develops any reaction to this medication then she is to stop it immediately and let us know.  I also recommended an x-ray of the leg making sure there is no osteomyelitis and that shin bone given how close that bone is to the skin.  Daughter will take her for an x-ray today.  We will see her back here on  Thursday  -     cefUROXime (CEFTIN) 500 MG tablet; Take 1 tablet by mouth 2 times daily for 7 days  -     XR TIBIA FIBULA LEFT (2 VIEWS); Future  -     XR TIBIA FIBULA LEFT (2 VIEWS)    Other orders  -     amitriptyline (ELAVIL) 25 MG tablet; Take 0.5 tablets by mouth nightly  -     colestipol (COLESTID) 1 g tablet; Take 1 tablet by mouth daily  -     potassium chloride (KLOR-CON) 10 MEQ extended release tablet; Take 1 tablet by mouth daily       Return in about 3 days (around 2/16/2023) for f/u on cellulitis.          Jae Judd MD

## 2023-02-14 ENCOUNTER — TELEPHONE (OUTPATIENT)
Dept: FAMILY MEDICINE CLINIC | Facility: CLINIC | Age: 88
End: 2023-02-14

## 2023-02-16 ENCOUNTER — OFFICE VISIT (OUTPATIENT)
Dept: FAMILY MEDICINE CLINIC | Facility: CLINIC | Age: 88
End: 2023-02-16
Payer: MEDICARE

## 2023-02-16 VITALS
WEIGHT: 128.4 LBS | HEIGHT: 61 IN | HEART RATE: 63 BPM | TEMPERATURE: 97.3 F | BODY MASS INDEX: 24.24 KG/M2 | DIASTOLIC BLOOD PRESSURE: 76 MMHG | OXYGEN SATURATION: 94 % | SYSTOLIC BLOOD PRESSURE: 144 MMHG

## 2023-02-16 DIAGNOSIS — L03.116 CELLULITIS OF LEFT LOWER EXTREMITY: Primary | ICD-10-CM

## 2023-02-16 PROCEDURE — 1123F ACP DISCUSS/DSCN MKR DOCD: CPT | Performed by: FAMILY MEDICINE

## 2023-02-16 PROCEDURE — 1036F TOBACCO NON-USER: CPT | Performed by: FAMILY MEDICINE

## 2023-02-16 PROCEDURE — G8484 FLU IMMUNIZE NO ADMIN: HCPCS | Performed by: FAMILY MEDICINE

## 2023-02-16 PROCEDURE — 1090F PRES/ABSN URINE INCON ASSESS: CPT | Performed by: FAMILY MEDICINE

## 2023-02-16 PROCEDURE — G8420 CALC BMI NORM PARAMETERS: HCPCS | Performed by: FAMILY MEDICINE

## 2023-02-16 PROCEDURE — G8427 DOCREV CUR MEDS BY ELIG CLIN: HCPCS | Performed by: FAMILY MEDICINE

## 2023-02-16 PROCEDURE — 99213 OFFICE O/P EST LOW 20 MIN: CPT | Performed by: FAMILY MEDICINE

## 2023-02-16 ASSESSMENT — PATIENT HEALTH QUESTIONNAIRE - PHQ9
SUM OF ALL RESPONSES TO PHQ QUESTIONS 1-9: 0
5. POOR APPETITE OR OVEREATING: 0
SUM OF ALL RESPONSES TO PHQ QUESTIONS 1-9: 0
4. FEELING TIRED OR HAVING LITTLE ENERGY: 0
SUM OF ALL RESPONSES TO PHQ QUESTIONS 1-9: 0
SUM OF ALL RESPONSES TO PHQ QUESTIONS 1-9: 0
6. FEELING BAD ABOUT YOURSELF - OR THAT YOU ARE A FAILURE OR HAVE LET YOURSELF OR YOUR FAMILY DOWN: 0
10. IF YOU CHECKED OFF ANY PROBLEMS, HOW DIFFICULT HAVE THESE PROBLEMS MADE IT FOR YOU TO DO YOUR WORK, TAKE CARE OF THINGS AT HOME, OR GET ALONG WITH OTHER PEOPLE: 0
3. TROUBLE FALLING OR STAYING ASLEEP: 0
8. MOVING OR SPEAKING SO SLOWLY THAT OTHER PEOPLE COULD HAVE NOTICED. OR THE OPPOSITE, BEING SO FIGETY OR RESTLESS THAT YOU HAVE BEEN MOVING AROUND A LOT MORE THAN USUAL: 0
7. TROUBLE CONCENTRATING ON THINGS, SUCH AS READING THE NEWSPAPER OR WATCHING TELEVISION: 0
1. LITTLE INTEREST OR PLEASURE IN DOING THINGS: 0
SUM OF ALL RESPONSES TO PHQ9 QUESTIONS 1 & 2: 0
9. THOUGHTS THAT YOU WOULD BE BETTER OFF DEAD, OR OF HURTING YOURSELF: 0
2. FEELING DOWN, DEPRESSED OR HOPELESS: 0

## 2023-02-16 ASSESSMENT — ENCOUNTER SYMPTOMS: ROS SKIN COMMENTS: L SHIN

## 2023-02-16 NOTE — PROGRESS NOTES
[facility]  Bridger Ryan  Phone [phone]  Fax:  [phone]    Patient: Luz Elena Gusman  YOB: 1935  Patient Age 80 y.o. Patient sex: female  Medical Record:  [id number]  Visit Date: 02/16/23    [facility] Practice Clinic Note     Chief Complaint   Patient presents with    Follow-up     Pt is back for follow up for her left leg injury       History of Present Illness:  Pt here for f/u on cellulitis and Wound on L anterior shin. Patient is here with her daughter today. Wound is better now that she is taking abx. No rash or rxn from abx. She is trying to elevate her leg and stay off of it. Daughter is usine xeroform dressing also. Still swelling but not worsening. Pain in minimal     Slipped off her step  at home and hit her leg. Allergies:   Allergies   Allergen Reactions    Ace Inhibitors Other (See Comments)    Amlodipine Other (See Comments)     GI    Amoxicillin Other (See Comments)    Codeine Other (See Comments)    Erythromycin Other (See Comments)    Sulfa Antibiotics Other (See Comments)       Current Medications:   Medications marked \"taking\" at this time:    Current Outpatient Medications:     cefUROXime (CEFTIN) 500 MG tablet, Take 1 tablet by mouth 2 times daily for 7 days, Disp: 14 tablet, Rfl: 0    amitriptyline (ELAVIL) 25 MG tablet, Take 0.5 tablets by mouth nightly, Disp: 90 tablet, Rfl: 3    colestipol (COLESTID) 1 g tablet, Take 1 tablet by mouth daily, Disp: 90 tablet, Rfl: 3    potassium chloride (KLOR-CON) 10 MEQ extended release tablet, Take 1 tablet by mouth daily, Disp: 90 tablet, Rfl: 3    doxycycline hyclate (VIBRA-TABS) 100 MG tablet, Take 1 tablet by mouth 2 times daily for 7 days, Disp: 14 tablet, Rfl: 0    atenolol (TENORMIN) 25 MG tablet, TAKE 1/2 TABLET BY MOUTH 2 TIMES DAILY, Disp: 30 tablet, Rfl: 5    citalopram (CELEXA) 20 MG tablet, Take 1 tablet by mouth at bedtime TAKE 1 & 1/2 TABLETS BY MOUTH AT BEDTIME, Disp: 30 tablet, Rfl: 5    losartan-hydroCHLOROthiazide (HYZAAR) 100-12.5 MG per tablet, Take 1 tablet by mouth daily, Disp: 30 tablet, Rfl: 5    omeprazole (PRILOSEC) 20 MG delayed release capsule, Take 1 capsule by mouth Daily, Disp: 30 capsule, Rfl: 5    simvastatin (ZOCOR) 40 MG tablet, TAKE 1/2 TABLET BY MOUTH EVERY EVENING, Disp: 30 tablet, Rfl: 5    Lancets MISC, Check blood sugars BID. E11.9, Disp: , Rfl:     aspirin 81 MG EC tablet, Take by mouth daily, Disp: , Rfl:     latanoprost (XALATAN) 0.005 % ophthalmic solution, PUT 1 DROP INTO BOTH EYES AT BEDTIME, Disp: , Rfl:     timolol (TIMOPTIC-XE) 0.5 % ophthalmic gel-forming, Apply 1 drop to eye daily, Disp: , Rfl:     Current Problem List:   Patient Active Problem List   Diagnosis    Reflux esophagitis    Primary insomnia    Moderate major depression (HCC)    Rosacea    Irritable bowel syndrome    Essential hypertension    Hyperlipidemia with target LDL less than 100    Bilateral hearing loss    Anxiety    Stage 3 chronic kidney disease, unspecified whether stage 3a or 3b CKD (HCC)    Type 2 diabetes mellitus    Type 2 diabetes mellitus with chronic kidney disease    Cellulitis of left lower extremity       Social History:   Social History     Tobacco Use    Smoking status: Never    Smokeless tobacco: Never   Substance Use Topics    Alcohol use: No     Alcohol/week: 0.0 standard drinks       Family History:   Family History   Problem Relation Age of Onset    Hypertension Neg Hx     Cancer Mother     No Known Problems Paternal Grandfather     No Known Problems Paternal Grandmother     No Known Problems Maternal Grandfather     No Known Problems Maternal Grandmother     Heart Disease Father     Diabetes Father        Surgical History:  Past Surgical History:   Procedure Laterality Date    [facility]  2004    CATARACT REMOVAL Bilateral 2016    CHOLECYSTECTOMY  1965    COLONOSCOPY  04/03/2013    repeat in 5 years     HEENT TUBAL LIGATION         ROS  Review of Systems   Skin:  Positive for wound. L shin     Visit Vitals  BP (!) 144/76   Pulse 63   Temp 97.3 °F (36.3 °C)   Ht 5' 1\" (1.549 m)   Wt 128 lb 6.4 oz (58.2 kg)   SpO2 94%   BMI 24.26 kg/m²       Physical Exam  Physical Exam  Skin:     General: Skin is warm and dry. Comments: Open skin -laceration is healing well. Hematoma is still there and firm. No drainage. slightly smaller   Redness in leg has lessened. No longer down her anterior ankle. Non tender today. ASSESSMENT & PLAN      I have reviewed the patient's past medical history, social history and family history and vitals. We have discussed treatment plan and follow up and given patient instructions. Patient's questions are answered and we will follow up as indicated. Alfred was seen today for follow-up. Diagnoses and all orders for this visit:    Cellulitis of left lower extremity -improving cellulitis. Small abrasion over hematoma is healing well. The hematoma itself is still large but is somewhat smaller. Discussed keeping the leg up. We are wrapping the leg with Coban to give some pressure over the hematoma. Daughter is also placing a sock over the Coban. Daughter has been using some Xeroform over the abrasion. I discussed not using that for a long period of time as it can cause some skin breakdown. Return in about 1 week (around 2/23/2023) for Wound f/u.          Donald Brandt MD

## 2023-02-24 ENCOUNTER — OFFICE VISIT (OUTPATIENT)
Dept: FAMILY MEDICINE CLINIC | Facility: CLINIC | Age: 88
End: 2023-02-24
Payer: MEDICARE

## 2023-02-24 VITALS
TEMPERATURE: 97.2 F | SYSTOLIC BLOOD PRESSURE: 138 MMHG | OXYGEN SATURATION: 97 % | BODY MASS INDEX: 24.2 KG/M2 | HEIGHT: 61 IN | WEIGHT: 128.2 LBS | DIASTOLIC BLOOD PRESSURE: 74 MMHG | HEART RATE: 63 BPM

## 2023-02-24 DIAGNOSIS — L03.116 CELLULITIS OF LEFT LOWER EXTREMITY: Primary | ICD-10-CM

## 2023-02-24 PROCEDURE — G8420 CALC BMI NORM PARAMETERS: HCPCS | Performed by: FAMILY MEDICINE

## 2023-02-24 PROCEDURE — G8427 DOCREV CUR MEDS BY ELIG CLIN: HCPCS | Performed by: FAMILY MEDICINE

## 2023-02-24 PROCEDURE — 1123F ACP DISCUSS/DSCN MKR DOCD: CPT | Performed by: FAMILY MEDICINE

## 2023-02-24 PROCEDURE — G8484 FLU IMMUNIZE NO ADMIN: HCPCS | Performed by: FAMILY MEDICINE

## 2023-02-24 PROCEDURE — 99212 OFFICE O/P EST SF 10 MIN: CPT | Performed by: FAMILY MEDICINE

## 2023-02-24 PROCEDURE — 1090F PRES/ABSN URINE INCON ASSESS: CPT | Performed by: FAMILY MEDICINE

## 2023-02-24 PROCEDURE — 1036F TOBACCO NON-USER: CPT | Performed by: FAMILY MEDICINE

## 2023-02-24 ASSESSMENT — PATIENT HEALTH QUESTIONNAIRE - PHQ9
9. THOUGHTS THAT YOU WOULD BE BETTER OFF DEAD, OR OF HURTING YOURSELF: 0
8. MOVING OR SPEAKING SO SLOWLY THAT OTHER PEOPLE COULD HAVE NOTICED. OR THE OPPOSITE, BEING SO FIGETY OR RESTLESS THAT YOU HAVE BEEN MOVING AROUND A LOT MORE THAN USUAL: 0
SUM OF ALL RESPONSES TO PHQ QUESTIONS 1-9: 0
SUM OF ALL RESPONSES TO PHQ9 QUESTIONS 1 & 2: 0
7. TROUBLE CONCENTRATING ON THINGS, SUCH AS READING THE NEWSPAPER OR WATCHING TELEVISION: 0
2. FEELING DOWN, DEPRESSED OR HOPELESS: 0
3. TROUBLE FALLING OR STAYING ASLEEP: 0
5. POOR APPETITE OR OVEREATING: 0
SUM OF ALL RESPONSES TO PHQ QUESTIONS 1-9: 0
10. IF YOU CHECKED OFF ANY PROBLEMS, HOW DIFFICULT HAVE THESE PROBLEMS MADE IT FOR YOU TO DO YOUR WORK, TAKE CARE OF THINGS AT HOME, OR GET ALONG WITH OTHER PEOPLE: 0
SUM OF ALL RESPONSES TO PHQ QUESTIONS 1-9: 0
SUM OF ALL RESPONSES TO PHQ QUESTIONS 1-9: 0
1. LITTLE INTEREST OR PLEASURE IN DOING THINGS: 0
6. FEELING BAD ABOUT YOURSELF - OR THAT YOU ARE A FAILURE OR HAVE LET YOURSELF OR YOUR FAMILY DOWN: 0
4. FEELING TIRED OR HAVING LITTLE ENERGY: 0

## 2023-02-24 ASSESSMENT — ANXIETY QUESTIONNAIRES
3. WORRYING TOO MUCH ABOUT DIFFERENT THINGS: 1
1. FEELING NERVOUS, ANXIOUS, OR ON EDGE: 1
7. FEELING AFRAID AS IF SOMETHING AWFUL MIGHT HAPPEN: 1
4. TROUBLE RELAXING: 1
IF YOU CHECKED OFF ANY PROBLEMS ON THIS QUESTIONNAIRE, HOW DIFFICULT HAVE THESE PROBLEMS MADE IT FOR YOU TO DO YOUR WORK, TAKE CARE OF THINGS AT HOME, OR GET ALONG WITH OTHER PEOPLE: NOT DIFFICULT AT ALL
2. NOT BEING ABLE TO STOP OR CONTROL WORRYING: 1
5. BEING SO RESTLESS THAT IT IS HARD TO SIT STILL: 1
6. BECOMING EASILY ANNOYED OR IRRITABLE: 1
GAD7 TOTAL SCORE: 7

## 2023-02-24 NOTE — PROGRESS NOTES
[facility]  Bridger Ryan  Phone [phone]  Fax:  [phone]    Patient: Jozef Brewer  YOB: 1935  Patient Age 80 y.o. Patient sex: female  Medical Record:  [id number]  Visit Date: 02/24/23    [facility] Practice Clinic Note     Chief Complaint   Patient presents with    Other    Follow-up     Pt is here to follow up on left leg,Pt is still having swelling an redness around the wound       History of Present Illness:  Pt here for f/u on cellulitis and Wound on L anterior shin. Patient is here with her daughter today. Wound is better now that she is taking abx. No rash or rxn from abx. She is trying to elevate her leg and stay off of it. Daughter is usine xeroform dressing also. Still swelling but not worsening. Pain in minimal     Slipped off her step  at home and hit her leg. Allergies:   Allergies   Allergen Reactions    Ace Inhibitors Other (See Comments)    Amlodipine Other (See Comments)     GI    Amoxicillin Other (See Comments)    Codeine Other (See Comments)    Erythromycin Other (See Comments)    Sulfa Antibiotics Other (See Comments)       Current Medications:   Medications marked \"taking\" at this time:    Current Outpatient Medications:     amitriptyline (ELAVIL) 25 MG tablet, Take 0.5 tablets by mouth nightly, Disp: 90 tablet, Rfl: 3    colestipol (COLESTID) 1 g tablet, Take 1 tablet by mouth daily, Disp: 90 tablet, Rfl: 3    potassium chloride (KLOR-CON) 10 MEQ extended release tablet, Take 1 tablet by mouth daily, Disp: 90 tablet, Rfl: 3    atenolol (TENORMIN) 25 MG tablet, TAKE 1/2 TABLET BY MOUTH 2 TIMES DAILY, Disp: 30 tablet, Rfl: 5    citalopram (CELEXA) 20 MG tablet, Take 1 tablet by mouth at bedtime TAKE 1 & 1/2 TABLETS BY MOUTH AT BEDTIME, Disp: 30 tablet, Rfl: 5    losartan-hydroCHLOROthiazide (HYZAAR) 100-12.5 MG per tablet, Take 1 tablet by mouth daily, Disp: 30 tablet, Rfl: 5    omeprazole (PRILOSEC) 20 MG delayed release capsule, Take 1 capsule by mouth Daily, Disp: 30 capsule, Rfl: 5    simvastatin (ZOCOR) 40 MG tablet, TAKE 1/2 TABLET BY MOUTH EVERY EVENING, Disp: 30 tablet, Rfl: 5    Lancets MISC, Check blood sugars BID. E11.9, Disp: , Rfl:     aspirin 81 MG EC tablet, Take by mouth daily, Disp: , Rfl:     latanoprost (XALATAN) 0.005 % ophthalmic solution, PUT 1 DROP INTO BOTH EYES AT BEDTIME, Disp: , Rfl:     timolol (TIMOPTIC-XE) 0.5 % ophthalmic gel-forming, Apply 1 drop to eye daily, Disp: , Rfl:     Current Problem List:   Patient Active Problem List   Diagnosis    Reflux esophagitis    Primary insomnia    Moderate major depression (HCC)    Rosacea    Irritable bowel syndrome    Essential hypertension    Hyperlipidemia with target LDL less than 100    Bilateral hearing loss    Anxiety    Stage 3 chronic kidney disease, unspecified whether stage 3a or 3b CKD (HCC)    Type 2 diabetes mellitus    Type 2 diabetes mellitus with chronic kidney disease    Cellulitis of left lower extremity       Social History:   Social History     Tobacco Use    Smoking status: Never    Smokeless tobacco: Never   Substance Use Topics    Alcohol use: No     Alcohol/week: 0.0 standard drinks       Family History:   Family History   Problem Relation Age of Onset    Hypertension Neg Hx     Cancer Mother     No Known Problems Paternal Grandfather     No Known Problems Paternal Grandmother     No Known Problems Maternal Grandfather     No Known Problems Maternal Grandmother     Heart Disease Father     Diabetes Father        Surgical History:  Past Surgical History:   Procedure Laterality Date    APPENDECTOMY  1965    CARDIAC CATHETERIZATION  2004    CATARACT REMOVAL Bilateral 2016    CHOLECYSTECTOMY  1965    COLONOSCOPY  04/03/2013    repeat in 5 years     HEENT      TUBAL LIGATION         ROS  Review of Systems   Skin:  Positive for wound.      Visit Vitals  /74   Pulse 63   Temp 97.2 °F (36.2 °C)   Ht 5' 1\" (1.549 m)   Wt 128 lb 3.2 oz (58.2 kg)   SpO2 97%   BMI 24.22 kg/m²       Physical Exam  Physical Exam  Skin:     General: Skin is warm. Comments: Continued redness in L LE - Still swelling over mid tibia with redness. No drainage. Swelling has improved. Still redness over area but has diminished above the ankle. ASSESSMENT & PLAN      I have reviewed the patient's past medical history, social history and family history and vitals. We have discussed treatment plan and follow up and given patient instructions. Patient's questions are answered and we will follow up as indicated. Alfred was seen today for f/u on cellulitis    Diagnoses and all orders for this visit:    Cellulitis of left lower extremity- Improving. Keep elevated. Still swelling with walking. Will take about another month. Will f/u in a week. To call if any worsening of pain or redness. .        Return in about 1 week (around 3/3/2023) for Wound f/u.          Froylan Shah MD

## 2023-03-03 ENCOUNTER — OFFICE VISIT (OUTPATIENT)
Dept: FAMILY MEDICINE CLINIC | Facility: CLINIC | Age: 88
End: 2023-03-03
Payer: MEDICARE

## 2023-03-03 VITALS — HEIGHT: 61 IN | BODY MASS INDEX: 24.17 KG/M2 | TEMPERATURE: 97.2 F | OXYGEN SATURATION: 97 % | WEIGHT: 128 LBS

## 2023-03-03 DIAGNOSIS — L03.116 CELLULITIS OF LEFT LOWER EXTREMITY: Primary | ICD-10-CM

## 2023-03-03 PROCEDURE — 1123F ACP DISCUSS/DSCN MKR DOCD: CPT | Performed by: FAMILY MEDICINE

## 2023-03-03 PROCEDURE — 99212 OFFICE O/P EST SF 10 MIN: CPT | Performed by: FAMILY MEDICINE

## 2023-03-03 PROCEDURE — G8484 FLU IMMUNIZE NO ADMIN: HCPCS | Performed by: FAMILY MEDICINE

## 2023-03-03 PROCEDURE — 1036F TOBACCO NON-USER: CPT | Performed by: FAMILY MEDICINE

## 2023-03-03 PROCEDURE — 1090F PRES/ABSN URINE INCON ASSESS: CPT | Performed by: FAMILY MEDICINE

## 2023-03-03 PROCEDURE — G8427 DOCREV CUR MEDS BY ELIG CLIN: HCPCS | Performed by: FAMILY MEDICINE

## 2023-03-03 PROCEDURE — G8420 CALC BMI NORM PARAMETERS: HCPCS | Performed by: FAMILY MEDICINE

## 2023-03-03 ASSESSMENT — PATIENT HEALTH QUESTIONNAIRE - PHQ9
6. FEELING BAD ABOUT YOURSELF - OR THAT YOU ARE A FAILURE OR HAVE LET YOURSELF OR YOUR FAMILY DOWN: 0
4. FEELING TIRED OR HAVING LITTLE ENERGY: 0
3. TROUBLE FALLING OR STAYING ASLEEP: 0
SUM OF ALL RESPONSES TO PHQ QUESTIONS 1-9: 0
SUM OF ALL RESPONSES TO PHQ QUESTIONS 1-9: 0
8. MOVING OR SPEAKING SO SLOWLY THAT OTHER PEOPLE COULD HAVE NOTICED. OR THE OPPOSITE, BEING SO FIGETY OR RESTLESS THAT YOU HAVE BEEN MOVING AROUND A LOT MORE THAN USUAL: 0
9. THOUGHTS THAT YOU WOULD BE BETTER OFF DEAD, OR OF HURTING YOURSELF: 0
2. FEELING DOWN, DEPRESSED OR HOPELESS: 0
5. POOR APPETITE OR OVEREATING: 0
SUM OF ALL RESPONSES TO PHQ9 QUESTIONS 1 & 2: 0
10. IF YOU CHECKED OFF ANY PROBLEMS, HOW DIFFICULT HAVE THESE PROBLEMS MADE IT FOR YOU TO DO YOUR WORK, TAKE CARE OF THINGS AT HOME, OR GET ALONG WITH OTHER PEOPLE: 0
7. TROUBLE CONCENTRATING ON THINGS, SUCH AS READING THE NEWSPAPER OR WATCHING TELEVISION: 0
SUM OF ALL RESPONSES TO PHQ QUESTIONS 1-9: 0
1. LITTLE INTEREST OR PLEASURE IN DOING THINGS: 0
SUM OF ALL RESPONSES TO PHQ QUESTIONS 1-9: 0

## 2023-03-03 ASSESSMENT — ANXIETY QUESTIONNAIRES
GAD7 TOTAL SCORE: 0
7. FEELING AFRAID AS IF SOMETHING AWFUL MIGHT HAPPEN: 0
6. BECOMING EASILY ANNOYED OR IRRITABLE: 0
2. NOT BEING ABLE TO STOP OR CONTROL WORRYING: 0
3. WORRYING TOO MUCH ABOUT DIFFERENT THINGS: 0
1. FEELING NERVOUS, ANXIOUS, OR ON EDGE: 0
5. BEING SO RESTLESS THAT IT IS HARD TO SIT STILL: 0
IF YOU CHECKED OFF ANY PROBLEMS ON THIS QUESTIONNAIRE, HOW DIFFICULT HAVE THESE PROBLEMS MADE IT FOR YOU TO DO YOUR WORK, TAKE CARE OF THINGS AT HOME, OR GET ALONG WITH OTHER PEOPLE: NOT DIFFICULT AT ALL
4. TROUBLE RELAXING: 0

## 2023-03-03 NOTE — PROGRESS NOTES
[facility]  Bridger Ryan  Phone [phone]  Fax:  [phone]    Patient: Harjinder Wills  YOB: 1935  Patient Age 80 y.o. Patient sex: female  Medical Record:  [id number]  Visit Date: 03/05/23    [facility] Practice Clinic Note     Chief Complaint   Patient presents with    Follow-up     1 week follow up for leg       History of Present Illness:  Here for wound follow up. Has completed abx   Pt here for f/u on cellulitis and Wound on L anterior shin. Wound is better - less swelling. Limited pain. Laceration is better  . She is trying to elevate her leg and stay off of it. Allergies:   Allergies   Allergen Reactions    Ace Inhibitors Other (See Comments)    Amlodipine Other (See Comments)     GI    Amoxicillin Other (See Comments)    Codeine Other (See Comments)    Erythromycin Other (See Comments)    Sulfa Antibiotics Other (See Comments)       Current Medications:   Medications marked \"taking\" at this time:    Current Outpatient Medications:     amitriptyline (ELAVIL) 25 MG tablet, Take 0.5 tablets by mouth nightly, Disp: 90 tablet, Rfl: 3    colestipol (COLESTID) 1 g tablet, Take 1 tablet by mouth daily, Disp: 90 tablet, Rfl: 3    potassium chloride (KLOR-CON) 10 MEQ extended release tablet, Take 1 tablet by mouth daily, Disp: 90 tablet, Rfl: 3    atenolol (TENORMIN) 25 MG tablet, TAKE 1/2 TABLET BY MOUTH 2 TIMES DAILY, Disp: 30 tablet, Rfl: 5    citalopram (CELEXA) 20 MG tablet, Take 1 tablet by mouth at bedtime TAKE 1 & 1/2 TABLETS BY MOUTH AT BEDTIME, Disp: 30 tablet, Rfl: 5    losartan-hydroCHLOROthiazide (HYZAAR) 100-12.5 MG per tablet, Take 1 tablet by mouth daily, Disp: 30 tablet, Rfl: 5    omeprazole (PRILOSEC) 20 MG delayed release capsule, Take 1 capsule by mouth Daily, Disp: 30 capsule, Rfl: 5    simvastatin (ZOCOR) 40 MG tablet, TAKE 1/2 TABLET BY MOUTH EVERY EVENING, Disp: 30 tablet, Rfl: 5    Lancets MISC, Check blood sugars BID. E11.9, Disp: , Rfl:     aspirin 81 MG EC tablet, Take by mouth daily, Disp: , Rfl:     latanoprost (XALATAN) 0.005 % ophthalmic solution, PUT 1 DROP INTO BOTH EYES AT BEDTIME, Disp: , Rfl:     timolol (TIMOPTIC-XE) 0.5 % ophthalmic gel-forming, Apply 1 drop to eye daily, Disp: , Rfl:     Current Problem List:   Patient Active Problem List   Diagnosis    Reflux esophagitis    Primary insomnia    Moderate major depression (HCC)    Rosacea    Irritable bowel syndrome    Essential hypertension    Hyperlipidemia with target LDL less than 100    Bilateral hearing loss    Anxiety    Stage 3 chronic kidney disease, unspecified whether stage 3a or 3b CKD (HCC)    Type 2 diabetes mellitus    Type 2 diabetes mellitus with chronic kidney disease    Cellulitis of left lower extremity       Social History:   Social History     Tobacco Use    Smoking status: Never    Smokeless tobacco: Never   Substance Use Topics    Alcohol use: No     Alcohol/week: 0.0 standard drinks       Family History:   Family History   Problem Relation Age of Onset    Hypertension Neg Hx     Cancer Mother     No Known Problems Paternal Grandfather     No Known Problems Paternal Grandmother     No Known Problems Maternal Grandfather     No Known Problems Maternal Grandmother     Heart Disease Father     Diabetes Father        Surgical History:  Past Surgical History:   Procedure Laterality Date    APPENDECTOMY  1965    CARDIAC CATHETERIZATION  2004    CATARACT REMOVAL Bilateral 2016    CHOLECYSTECTOMY  1965    COLONOSCOPY  04/03/2013    repeat in 5 years     HEENT      TUBAL LIGATION         ROS  Review of Systems   Skin:  Positive for wound. Visit Vitals  Temp 97.2 °F (36.2 °C)   Ht 5' 1\" (1.549 m)   Wt 128 lb (58.1 kg)   SpO2 97%   BMI 24.19 kg/m²       Physical Exam  Physical Exam  Constitutional:       Appearance: Normal appearance. She is normal weight. Skin:     General: Skin is warm and dry. Findings: Lesion present.       Comments: L anterior shin - still large hematoma. Laceration has healed. Redness has lessened. Mostly over hematoma. Neurological:      General: No focal deficit present. Mental Status: She is alert and oriented to person, place, and time. Mental status is at baseline. Psychiatric:         Mood and Affect: Mood normal.         ASSESSMENT & PLAN      I have reviewed the patient's past medical history, social history and family history and vitals. We have discussed treatment plan and follow up and given patient instructions. Patient's questions are answered and we will follow up as indicated. Alfred was seen today for follow-up. Diagnoses and all orders for this visit:    Cellulitis of left lower extremity-wound has improved and cellulitis has resolved status post antibiotics. Discussed that the hematoma will take months to resolve and may never completely go away. Recommended continued elevation of the leg and if any worsening symptoms such as pain, swelling, redness, she is to call us. Return for 6 mos f/u.          Julianna Salter MD

## 2023-03-28 ENCOUNTER — NURSE TRIAGE (OUTPATIENT)
Dept: OTHER | Facility: CLINIC | Age: 88
End: 2023-03-28

## 2023-03-28 NOTE — TELEPHONE ENCOUNTER
Received call from [facility] with [facility] Flag Complaint. Current Symptoms: Pt has been seen multiple times for her left leg cellulitis. She states that her symptoms are not getting any better. She would like to follow up with her PCP. She denies new or worsening symptoms. Recommended disposition: Duplicate call. No triage. Care advice provided, patient verbalizes understanding; denies any other questions or concerns; instructed to call back for any new or worsening symptoms. Patient/Caller agrees with recommended disposition; writer provided warm transfer to [facility] at [facility] for appointment scheduling    Attention Provider: Thank you for allowing me to participate in the care of your patient. The patient was connected to triage in response to information provided to the ECC/PSC. Please do not respond through this encounter as the response is not directed to a shared pool. Reason for Disposition   Caller has already spoken with the PCP and has no further questions.     Protocols used: No Contact or Duplicate Contact Call-ADULT-

## 2023-04-03 PROBLEM — E11.21 TYPE 2 DIABETES WITH NEPHROPATHY (HCC): Status: RESOLVED | Noted: 2018-04-03 | Resolved: 2021-04-14

## 2023-04-17 ENCOUNTER — OFFICE VISIT (OUTPATIENT)
Dept: FAMILY MEDICINE CLINIC | Facility: CLINIC | Age: 88
End: 2023-04-17
Payer: MEDICARE

## 2023-04-17 VITALS
WEIGHT: 123 LBS | BODY MASS INDEX: 23.22 KG/M2 | TEMPERATURE: 97 F | HEART RATE: 62 BPM | DIASTOLIC BLOOD PRESSURE: 90 MMHG | OXYGEN SATURATION: 94 % | HEIGHT: 61 IN | SYSTOLIC BLOOD PRESSURE: 142 MMHG

## 2023-04-17 DIAGNOSIS — I10 ESSENTIAL (PRIMARY) HYPERTENSION: ICD-10-CM

## 2023-04-17 DIAGNOSIS — E11.9 TYPE 2 DIABETES MELLITUS WITHOUT COMPLICATION, UNSPECIFIED WHETHER LONG TERM INSULIN USE (HCC): Primary | ICD-10-CM

## 2023-04-17 DIAGNOSIS — F51.01 PRIMARY INSOMNIA: ICD-10-CM

## 2023-04-17 DIAGNOSIS — E78.2 MIXED HYPERLIPIDEMIA: ICD-10-CM

## 2023-04-17 DIAGNOSIS — R22.42 LOCALIZED SWELLING OF LEFT LOWER EXTREMITY: ICD-10-CM

## 2023-04-17 DIAGNOSIS — T14.8XXA HEMATOMA: ICD-10-CM

## 2023-04-17 LAB
BASOPHILS # BLD: 0 K/UL (ref 0–0.2)
BASOPHILS NFR BLD: 1 % (ref 0–2)
DIFFERENTIAL METHOD BLD: ABNORMAL
EOSINOPHIL # BLD: 0.1 K/UL (ref 0–0.8)
EOSINOPHIL NFR BLD: 2 % (ref 0.5–7.8)
ERYTHROCYTE [DISTWIDTH] IN BLOOD BY AUTOMATED COUNT: 13.2 % (ref 11.9–14.6)
HCT VFR BLD AUTO: 47.3 % (ref 35.8–46.3)
HGB BLD-MCNC: 15.6 G/DL (ref 11.7–15.4)
IMM GRANULOCYTES # BLD AUTO: 0 K/UL (ref 0–0.5)
IMM GRANULOCYTES NFR BLD AUTO: 0 % (ref 0–5)
LYMPHOCYTES # BLD: 1.1 K/UL (ref 0.5–4.6)
LYMPHOCYTES NFR BLD: 25 % (ref 13–44)
MCH RBC QN AUTO: 30.1 PG (ref 26.1–32.9)
MCHC RBC AUTO-ENTMCNC: 33 G/DL (ref 31.4–35)
MCV RBC AUTO: 91.3 FL (ref 82–102)
MONOCYTES # BLD: 0.6 K/UL (ref 0.1–1.3)
MONOCYTES NFR BLD: 13 % (ref 4–12)
NEUTS SEG # BLD: 2.5 K/UL (ref 1.7–8.2)
NEUTS SEG NFR BLD: 59 % (ref 43–78)
NRBC # BLD: 0 K/UL (ref 0–0.2)
PLATELET # BLD AUTO: 124 K/UL (ref 150–450)
PMV BLD AUTO: 10.1 FL (ref 9.4–12.3)
RBC # BLD AUTO: 5.18 M/UL (ref 4.05–5.2)
WBC # BLD AUTO: 4.3 K/UL (ref 4.3–11.1)

## 2023-04-17 PROCEDURE — 1123F ACP DISCUSS/DSCN MKR DOCD: CPT | Performed by: STUDENT IN AN ORGANIZED HEALTH CARE EDUCATION/TRAINING PROGRAM

## 2023-04-17 PROCEDURE — G8427 DOCREV CUR MEDS BY ELIG CLIN: HCPCS | Performed by: STUDENT IN AN ORGANIZED HEALTH CARE EDUCATION/TRAINING PROGRAM

## 2023-04-17 PROCEDURE — 1036F TOBACCO NON-USER: CPT | Performed by: STUDENT IN AN ORGANIZED HEALTH CARE EDUCATION/TRAINING PROGRAM

## 2023-04-17 PROCEDURE — 99214 OFFICE O/P EST MOD 30 MIN: CPT | Performed by: STUDENT IN AN ORGANIZED HEALTH CARE EDUCATION/TRAINING PROGRAM

## 2023-04-17 PROCEDURE — 3044F HG A1C LEVEL LT 7.0%: CPT | Performed by: STUDENT IN AN ORGANIZED HEALTH CARE EDUCATION/TRAINING PROGRAM

## 2023-04-17 PROCEDURE — G8420 CALC BMI NORM PARAMETERS: HCPCS | Performed by: STUDENT IN AN ORGANIZED HEALTH CARE EDUCATION/TRAINING PROGRAM

## 2023-04-17 PROCEDURE — 1090F PRES/ABSN URINE INCON ASSESS: CPT | Performed by: STUDENT IN AN ORGANIZED HEALTH CARE EDUCATION/TRAINING PROGRAM

## 2023-04-17 RX ORDER — LOSARTAN POTASSIUM AND HYDROCHLOROTHIAZIDE 12.5; 1 MG/1; MG/1
1 TABLET ORAL DAILY
Qty: 90 TABLET | Refills: 3 | Status: SHIPPED | OUTPATIENT
Start: 2023-04-17

## 2023-04-17 RX ORDER — CITALOPRAM 20 MG/1
20 TABLET ORAL NIGHTLY
Qty: 90 TABLET | Refills: 3 | Status: SHIPPED | OUTPATIENT
Start: 2023-04-17

## 2023-04-17 RX ORDER — ATENOLOL 25 MG/1
TABLET ORAL
Qty: 90 TABLET | Refills: 3 | Status: SHIPPED | OUTPATIENT
Start: 2023-04-17

## 2023-04-17 RX ORDER — SIMVASTATIN 40 MG
TABLET ORAL
Qty: 90 TABLET | Refills: 3 | Status: SHIPPED | OUTPATIENT
Start: 2023-04-17

## 2023-04-17 RX ORDER — OMEPRAZOLE 20 MG/1
20 CAPSULE, DELAYED RELEASE ORAL DAILY
Qty: 90 CAPSULE | Refills: 3 | Status: SHIPPED | OUTPATIENT
Start: 2023-04-17

## 2023-04-17 ASSESSMENT — PATIENT HEALTH QUESTIONNAIRE - PHQ9
SUM OF ALL RESPONSES TO PHQ QUESTIONS 1-9: 0
1. LITTLE INTEREST OR PLEASURE IN DOING THINGS: 0
9. THOUGHTS THAT YOU WOULD BE BETTER OFF DEAD, OR OF HURTING YOURSELF: 0
2. FEELING DOWN, DEPRESSED OR HOPELESS: 0
5. POOR APPETITE OR OVEREATING: 0
10. IF YOU CHECKED OFF ANY PROBLEMS, HOW DIFFICULT HAVE THESE PROBLEMS MADE IT FOR YOU TO DO YOUR WORK, TAKE CARE OF THINGS AT HOME, OR GET ALONG WITH OTHER PEOPLE: 0
6. FEELING BAD ABOUT YOURSELF - OR THAT YOU ARE A FAILURE OR HAVE LET YOURSELF OR YOUR FAMILY DOWN: 0
SUM OF ALL RESPONSES TO PHQ QUESTIONS 1-9: 0
4. FEELING TIRED OR HAVING LITTLE ENERGY: 0
SUM OF ALL RESPONSES TO PHQ QUESTIONS 1-9: 0
8. MOVING OR SPEAKING SO SLOWLY THAT OTHER PEOPLE COULD HAVE NOTICED. OR THE OPPOSITE, BEING SO FIGETY OR RESTLESS THAT YOU HAVE BEEN MOVING AROUND A LOT MORE THAN USUAL: 0
SUM OF ALL RESPONSES TO PHQ9 QUESTIONS 1 & 2: 0
7. TROUBLE CONCENTRATING ON THINGS, SUCH AS READING THE NEWSPAPER OR WATCHING TELEVISION: 0
SUM OF ALL RESPONSES TO PHQ QUESTIONS 1-9: 0
3. TROUBLE FALLING OR STAYING ASLEEP: 0

## 2023-04-18 LAB
ALBUMIN SERPL-MCNC: 4.4 G/DL (ref 3.2–4.6)
ALBUMIN/GLOB SERPL: 1.5 (ref 0.4–1.6)
ALP SERPL-CCNC: 70 U/L (ref 50–136)
ALT SERPL-CCNC: 34 U/L (ref 12–65)
ANION GAP SERPL CALC-SCNC: 3 MMOL/L (ref 2–11)
AST SERPL-CCNC: 26 U/L (ref 15–37)
BILIRUB SERPL-MCNC: 1 MG/DL (ref 0.2–1.1)
BUN SERPL-MCNC: 15 MG/DL (ref 8–23)
CALCIUM SERPL-MCNC: 9.8 MG/DL (ref 8.3–10.4)
CHLORIDE SERPL-SCNC: 103 MMOL/L (ref 101–110)
CHOLEST SERPL-MCNC: 110 MG/DL
CO2 SERPL-SCNC: 31 MMOL/L (ref 21–32)
CREAT SERPL-MCNC: 0.9 MG/DL (ref 0.6–1)
EST. AVERAGE GLUCOSE BLD GHB EST-MCNC: 117 MG/DL
GLOBULIN SER CALC-MCNC: 2.9 G/DL (ref 2.8–4.5)
GLUCOSE SERPL-MCNC: 96 MG/DL (ref 65–100)
HBA1C MFR BLD: 5.7 % (ref 4.8–5.6)
HDLC SERPL-MCNC: 44 MG/DL (ref 40–60)
HDLC SERPL: 2.5
LDLC SERPL CALC-MCNC: 34.8 MG/DL
POTASSIUM SERPL-SCNC: 3.7 MMOL/L (ref 3.5–5.1)
PROT SERPL-MCNC: 7.3 G/DL (ref 6.3–8.2)
SODIUM SERPL-SCNC: 137 MMOL/L (ref 133–143)
TRIGL SERPL-MCNC: 156 MG/DL (ref 35–150)
VLDLC SERPL CALC-MCNC: 31.2 MG/DL (ref 6–23)

## 2023-04-20 RX ORDER — MONTELUKAST SODIUM 4 MG/1
TABLET, CHEWABLE ORAL
Qty: 90 TABLET | Refills: 1 | OUTPATIENT
Start: 2023-04-20

## 2023-04-20 RX ORDER — POTASSIUM CHLORIDE 750 MG/1
TABLET, FILM COATED, EXTENDED RELEASE ORAL
Qty: 90 TABLET | Refills: 1 | OUTPATIENT
Start: 2023-04-20

## 2023-05-10 DIAGNOSIS — F51.01 PRIMARY INSOMNIA: ICD-10-CM

## 2023-05-10 RX ORDER — AMITRIPTYLINE HYDROCHLORIDE 25 MG/1
TABLET, FILM COATED ORAL
Qty: 45 TABLET | Refills: 3 | OUTPATIENT
Start: 2023-05-10

## 2023-06-06 RX ORDER — MONTELUKAST SODIUM 4 MG/1
1 TABLET, CHEWABLE ORAL DAILY
Qty: 90 TABLET | Refills: 3 | Status: SHIPPED | OUTPATIENT
Start: 2023-06-06

## 2023-06-06 RX ORDER — AMITRIPTYLINE HYDROCHLORIDE 25 MG/1
12.5 TABLET, FILM COATED ORAL NIGHTLY
Qty: 90 TABLET | Refills: 3 | Status: SHIPPED | OUTPATIENT
Start: 2023-06-06

## 2023-06-06 RX ORDER — POTASSIUM CHLORIDE 750 MG/1
10 TABLET, FILM COATED, EXTENDED RELEASE ORAL DAILY
Qty: 90 TABLET | Refills: 3 | Status: SHIPPED | OUTPATIENT
Start: 2023-06-06

## 2023-06-06 NOTE — TELEPHONE ENCOUNTER
[facility] on [address] states they did not get the rx that was sent on 2/13  out now       Elavil  Colestid  Potassium

## 2023-08-23 ENCOUNTER — OFFICE VISIT (OUTPATIENT)
Dept: PRIMARY CARE CLINIC | Facility: CLINIC | Age: 88
End: 2023-08-23
Payer: MEDICARE

## 2023-08-23 ENCOUNTER — TELEPHONE (OUTPATIENT)
Dept: PRIMARY CARE CLINIC | Facility: CLINIC | Age: 88
End: 2023-08-23

## 2023-08-23 VITALS
TEMPERATURE: 97.3 F | BODY MASS INDEX: 22.15 KG/M2 | OXYGEN SATURATION: 95 % | HEART RATE: 61 BPM | SYSTOLIC BLOOD PRESSURE: 133 MMHG | WEIGHT: 117.3 LBS | HEIGHT: 61 IN | DIASTOLIC BLOOD PRESSURE: 64 MMHG

## 2023-08-23 DIAGNOSIS — F51.01 PRIMARY INSOMNIA: ICD-10-CM

## 2023-08-23 DIAGNOSIS — Z76.89 ENCOUNTER TO ESTABLISH CARE WITH NEW DOCTOR: Primary | ICD-10-CM

## 2023-08-23 DIAGNOSIS — Z79.899 ENCOUNTER FOR LONG-TERM (CURRENT) USE OF MEDICATIONS: ICD-10-CM

## 2023-08-23 DIAGNOSIS — F32.1 MAJOR DEPRESSIVE DISORDER, SINGLE EPISODE, MODERATE (HCC): ICD-10-CM

## 2023-08-23 DIAGNOSIS — E78.2 MIXED HYPERLIPIDEMIA: ICD-10-CM

## 2023-08-23 DIAGNOSIS — E55.9 VITAMIN D DEFICIENCY: ICD-10-CM

## 2023-08-23 DIAGNOSIS — E11.22 TYPE 2 DIABETES MELLITUS WITH STAGE 3A CHRONIC KIDNEY DISEASE, WITHOUT LONG-TERM CURRENT USE OF INSULIN (HCC): ICD-10-CM

## 2023-08-23 DIAGNOSIS — N18.31 TYPE 2 DIABETES MELLITUS WITH STAGE 3A CHRONIC KIDNEY DISEASE, WITHOUT LONG-TERM CURRENT USE OF INSULIN (HCC): ICD-10-CM

## 2023-08-23 DIAGNOSIS — F41.9 ANXIETY: ICD-10-CM

## 2023-08-23 DIAGNOSIS — I15.0 RENOVASCULAR HYPERTENSION: ICD-10-CM

## 2023-08-23 DIAGNOSIS — K21.9 GASTROESOPHAGEAL REFLUX DISEASE WITHOUT ESOPHAGITIS: ICD-10-CM

## 2023-08-23 PROCEDURE — G8420 CALC BMI NORM PARAMETERS: HCPCS | Performed by: FAMILY MEDICINE

## 2023-08-23 PROCEDURE — 1036F TOBACCO NON-USER: CPT | Performed by: FAMILY MEDICINE

## 2023-08-23 PROCEDURE — 3044F HG A1C LEVEL LT 7.0%: CPT | Performed by: FAMILY MEDICINE

## 2023-08-23 PROCEDURE — G8427 DOCREV CUR MEDS BY ELIG CLIN: HCPCS | Performed by: FAMILY MEDICINE

## 2023-08-23 PROCEDURE — 1090F PRES/ABSN URINE INCON ASSESS: CPT | Performed by: FAMILY MEDICINE

## 2023-08-23 PROCEDURE — 99214 OFFICE O/P EST MOD 30 MIN: CPT | Performed by: FAMILY MEDICINE

## 2023-08-23 PROCEDURE — 1123F ACP DISCUSS/DSCN MKR DOCD: CPT | Performed by: FAMILY MEDICINE

## 2023-08-23 RX ORDER — ATENOLOL 25 MG/1
TABLET ORAL
Qty: 90 TABLET | Refills: 3 | Status: SHIPPED | OUTPATIENT
Start: 2023-08-23

## 2023-08-23 RX ORDER — MONTELUKAST SODIUM 4 MG/1
1 TABLET, CHEWABLE ORAL DAILY
Qty: 90 TABLET | Refills: 3 | Status: SHIPPED | OUTPATIENT
Start: 2023-08-23

## 2023-08-23 RX ORDER — LOSARTAN POTASSIUM AND HYDROCHLOROTHIAZIDE 12.5; 1 MG/1; MG/1
1 TABLET ORAL DAILY
Qty: 90 TABLET | Refills: 3 | Status: SHIPPED | OUTPATIENT
Start: 2023-08-23

## 2023-08-23 RX ORDER — CLINDAMYCIN PHOSPHATE 10 UG/ML
LOTION TOPICAL
COMMUNITY
Start: 2023-06-09

## 2023-08-23 RX ORDER — POTASSIUM CHLORIDE 750 MG/1
10 TABLET, FILM COATED, EXTENDED RELEASE ORAL DAILY
Qty: 90 TABLET | Refills: 3 | Status: SHIPPED | OUTPATIENT
Start: 2023-08-23

## 2023-08-23 RX ORDER — LANCETS 30 GAUGE
EACH MISCELLANEOUS
COMMUNITY

## 2023-08-23 RX ORDER — CITALOPRAM 20 MG/1
20 TABLET ORAL NIGHTLY
Qty: 90 TABLET | Refills: 3 | Status: SHIPPED | OUTPATIENT
Start: 2023-08-23

## 2023-08-23 RX ORDER — OMEPRAZOLE 20 MG/1
20 CAPSULE, DELAYED RELEASE ORAL DAILY
Qty: 90 CAPSULE | Refills: 3 | Status: SHIPPED | OUTPATIENT
Start: 2023-08-23

## 2023-08-23 RX ORDER — AMITRIPTYLINE HYDROCHLORIDE 25 MG/1
12.5 TABLET, FILM COATED ORAL NIGHTLY
Qty: 90 TABLET | Refills: 3 | Status: SHIPPED | OUTPATIENT
Start: 2023-08-23

## 2023-08-23 RX ORDER — TIMOLOL MALEATE 5 MG/ML
SOLUTION/ DROPS OPHTHALMIC
COMMUNITY
Start: 2023-07-13 | End: 2023-08-23

## 2023-08-23 SDOH — ECONOMIC STABILITY: FOOD INSECURITY: WITHIN THE PAST 12 MONTHS, THE FOOD YOU BOUGHT JUST DIDN'T LAST AND YOU DIDN'T HAVE MONEY TO GET MORE.: NEVER TRUE

## 2023-08-23 SDOH — ECONOMIC STABILITY: INCOME INSECURITY: HOW HARD IS IT FOR YOU TO PAY FOR THE VERY BASICS LIKE FOOD, HOUSING, MEDICAL CARE, AND HEATING?: NOT VERY HARD

## 2023-08-23 SDOH — ECONOMIC STABILITY: FOOD INSECURITY: WITHIN THE PAST 12 MONTHS, YOU WORRIED THAT YOUR FOOD WOULD RUN OUT BEFORE YOU GOT MONEY TO BUY MORE.: NEVER TRUE

## 2023-08-23 ASSESSMENT — PATIENT HEALTH QUESTIONNAIRE - PHQ9
SUM OF ALL RESPONSES TO PHQ QUESTIONS 1-9: 0
2. FEELING DOWN, DEPRESSED OR HOPELESS: 0
SUM OF ALL RESPONSES TO PHQ QUESTIONS 1-9: 0
1. LITTLE INTEREST OR PLEASURE IN DOING THINGS: 0
SUM OF ALL RESPONSES TO PHQ9 QUESTIONS 1 & 2: 0

## 2023-11-16 ENCOUNTER — PATIENT MESSAGE (OUTPATIENT)
Dept: PRIMARY CARE CLINIC | Facility: CLINIC | Age: 88
End: 2023-11-16

## 2023-11-17 RX ORDER — CLINDAMYCIN PHOSPHATE 10 UG/ML
LOTION TOPICAL
Qty: 60 G | Refills: 5 | Status: SHIPPED | OUTPATIENT
Start: 2023-11-17

## 2024-02-19 DIAGNOSIS — E78.2 MIXED HYPERLIPIDEMIA: ICD-10-CM

## 2024-02-19 DIAGNOSIS — E11.22 TYPE 2 DIABETES MELLITUS WITH STAGE 3A CHRONIC KIDNEY DISEASE, WITHOUT LONG-TERM CURRENT USE OF INSULIN (HCC): ICD-10-CM

## 2024-02-19 DIAGNOSIS — Z79.899 ENCOUNTER FOR LONG-TERM (CURRENT) USE OF MEDICATIONS: ICD-10-CM

## 2024-02-19 DIAGNOSIS — I15.0 RENOVASCULAR HYPERTENSION: ICD-10-CM

## 2024-02-19 DIAGNOSIS — N18.31 TYPE 2 DIABETES MELLITUS WITH STAGE 3A CHRONIC KIDNEY DISEASE, WITHOUT LONG-TERM CURRENT USE OF INSULIN (HCC): ICD-10-CM

## 2024-02-19 DIAGNOSIS — E55.9 VITAMIN D DEFICIENCY: ICD-10-CM

## 2024-02-19 LAB
25(OH)D3 SERPL-MCNC: 65.6 NG/ML (ref 30–100)
ALBUMIN SERPL-MCNC: 4.2 G/DL (ref 3.2–4.6)
ALBUMIN/GLOB SERPL: 1.2 (ref 0.4–1.6)
ALP SERPL-CCNC: 70 U/L (ref 50–136)
ALT SERPL-CCNC: 39 U/L (ref 12–65)
ANION GAP SERPL CALC-SCNC: 6 MMOL/L (ref 2–11)
AST SERPL-CCNC: 32 U/L (ref 15–37)
BASOPHILS # BLD: 0 K/UL (ref 0–0.2)
BASOPHILS NFR BLD: 1 % (ref 0–2)
BILIRUB SERPL-MCNC: 1 MG/DL (ref 0.2–1.1)
BUN SERPL-MCNC: 25 MG/DL (ref 8–23)
CALCIUM SERPL-MCNC: 10.1 MG/DL (ref 8.3–10.4)
CHLORIDE SERPL-SCNC: 102 MMOL/L (ref 103–113)
CHOLEST SERPL-MCNC: 111 MG/DL
CO2 SERPL-SCNC: 30 MMOL/L (ref 21–32)
CREAT SERPL-MCNC: 1.1 MG/DL (ref 0.6–1)
DIFFERENTIAL METHOD BLD: ABNORMAL
EOSINOPHIL # BLD: 0.2 K/UL (ref 0–0.8)
EOSINOPHIL NFR BLD: 4 % (ref 0.5–7.8)
ERYTHROCYTE [DISTWIDTH] IN BLOOD BY AUTOMATED COUNT: 13.1 % (ref 11.9–14.6)
GLOBULIN SER CALC-MCNC: 3.4 G/DL (ref 2.8–4.5)
GLUCOSE SERPL-MCNC: 103 MG/DL (ref 65–100)
HCT VFR BLD AUTO: 48.4 % (ref 35.8–46.3)
HDLC SERPL-MCNC: 45 MG/DL (ref 40–60)
HDLC SERPL: 2.5
HGB BLD-MCNC: 15.9 G/DL (ref 11.7–15.4)
IMM GRANULOCYTES # BLD AUTO: 0 K/UL (ref 0–0.5)
IMM GRANULOCYTES NFR BLD AUTO: 1 % (ref 0–5)
LDLC SERPL CALC-MCNC: 33.4 MG/DL
LYMPHOCYTES # BLD: 1 K/UL (ref 0.5–4.6)
LYMPHOCYTES NFR BLD: 24 % (ref 13–44)
MCH RBC QN AUTO: 30.3 PG (ref 26.1–32.9)
MCHC RBC AUTO-ENTMCNC: 32.9 G/DL (ref 31.4–35)
MCV RBC AUTO: 92.2 FL (ref 82–102)
MONOCYTES # BLD: 0.5 K/UL (ref 0.1–1.3)
MONOCYTES NFR BLD: 12 % (ref 4–12)
NEUTS SEG # BLD: 2.5 K/UL (ref 1.7–8.2)
NEUTS SEG NFR BLD: 59 % (ref 43–78)
NRBC # BLD: 0 K/UL (ref 0–0.2)
PLATELET # BLD AUTO: 112 K/UL (ref 150–450)
PMV BLD AUTO: 10.3 FL (ref 9.4–12.3)
POTASSIUM SERPL-SCNC: 3.6 MMOL/L (ref 3.5–5.1)
PROT SERPL-MCNC: 7.6 G/DL (ref 6.3–8.2)
RBC # BLD AUTO: 5.25 M/UL (ref 4.05–5.2)
SODIUM SERPL-SCNC: 138 MMOL/L (ref 136–146)
TRIGL SERPL-MCNC: 163 MG/DL (ref 35–150)
VLDLC SERPL CALC-MCNC: 32.6 MG/DL (ref 6–23)
WBC # BLD AUTO: 4.3 K/UL (ref 4.3–11.1)

## 2024-02-20 LAB
EST. AVERAGE GLUCOSE BLD GHB EST-MCNC: 114 MG/DL
HBA1C MFR BLD: 5.6 % (ref 4.8–5.6)

## 2024-02-26 ENCOUNTER — OFFICE VISIT (OUTPATIENT)
Dept: PRIMARY CARE CLINIC | Facility: CLINIC | Age: 89
End: 2024-02-26
Payer: MEDICARE

## 2024-02-26 VITALS
SYSTOLIC BLOOD PRESSURE: 138 MMHG | WEIGHT: 113.7 LBS | HEART RATE: 58 BPM | OXYGEN SATURATION: 97 % | HEIGHT: 61 IN | DIASTOLIC BLOOD PRESSURE: 71 MMHG | TEMPERATURE: 97.6 F | BODY MASS INDEX: 21.47 KG/M2

## 2024-02-26 DIAGNOSIS — N18.31 TYPE 2 DIABETES MELLITUS WITH STAGE 3A CHRONIC KIDNEY DISEASE, WITHOUT LONG-TERM CURRENT USE OF INSULIN (HCC): Primary | ICD-10-CM

## 2024-02-26 DIAGNOSIS — F41.9 ANXIETY: ICD-10-CM

## 2024-02-26 DIAGNOSIS — F51.01 PRIMARY INSOMNIA: ICD-10-CM

## 2024-02-26 DIAGNOSIS — C44.320 SQUAMOUS CELL CARCINOMA, FACE: ICD-10-CM

## 2024-02-26 DIAGNOSIS — Z79.899 ENCOUNTER FOR LONG-TERM (CURRENT) USE OF MEDICATIONS: ICD-10-CM

## 2024-02-26 DIAGNOSIS — E11.22 TYPE 2 DIABETES MELLITUS WITH STAGE 3A CHRONIC KIDNEY DISEASE, WITHOUT LONG-TERM CURRENT USE OF INSULIN (HCC): Primary | ICD-10-CM

## 2024-02-26 DIAGNOSIS — K21.9 GASTROESOPHAGEAL REFLUX DISEASE WITHOUT ESOPHAGITIS: ICD-10-CM

## 2024-02-26 DIAGNOSIS — E78.2 MIXED HYPERLIPIDEMIA: ICD-10-CM

## 2024-02-26 DIAGNOSIS — I15.0 RENOVASCULAR HYPERTENSION: ICD-10-CM

## 2024-02-26 DIAGNOSIS — D69.6 THROMBOCYTOPENIA, UNSPECIFIED (HCC): ICD-10-CM

## 2024-02-26 DIAGNOSIS — F32.1 MAJOR DEPRESSIVE DISORDER, SINGLE EPISODE, MODERATE (HCC): ICD-10-CM

## 2024-02-26 PROCEDURE — 3044F HG A1C LEVEL LT 7.0%: CPT | Performed by: FAMILY MEDICINE

## 2024-02-26 PROCEDURE — G8484 FLU IMMUNIZE NO ADMIN: HCPCS | Performed by: FAMILY MEDICINE

## 2024-02-26 PROCEDURE — 99213 OFFICE O/P EST LOW 20 MIN: CPT | Performed by: FAMILY MEDICINE

## 2024-02-26 PROCEDURE — 1090F PRES/ABSN URINE INCON ASSESS: CPT | Performed by: FAMILY MEDICINE

## 2024-02-26 PROCEDURE — G8420 CALC BMI NORM PARAMETERS: HCPCS | Performed by: FAMILY MEDICINE

## 2024-02-26 PROCEDURE — 1036F TOBACCO NON-USER: CPT | Performed by: FAMILY MEDICINE

## 2024-02-26 PROCEDURE — G8427 DOCREV CUR MEDS BY ELIG CLIN: HCPCS | Performed by: FAMILY MEDICINE

## 2024-02-26 PROCEDURE — 1123F ACP DISCUSS/DSCN MKR DOCD: CPT | Performed by: FAMILY MEDICINE

## 2024-02-26 RX ORDER — LOSARTAN POTASSIUM AND HYDROCHLOROTHIAZIDE 12.5; 1 MG/1; MG/1
1 TABLET ORAL DAILY
Qty: 90 TABLET | Refills: 3 | Status: SHIPPED | OUTPATIENT
Start: 2024-02-26

## 2024-02-26 RX ORDER — CLINDAMYCIN PHOSPHATE 10 UG/ML
LOTION TOPICAL
Qty: 60 G | Refills: 5 | Status: SHIPPED | OUTPATIENT
Start: 2024-02-26

## 2024-02-26 RX ORDER — OMEPRAZOLE 20 MG/1
20 CAPSULE, DELAYED RELEASE ORAL DAILY
Qty: 90 CAPSULE | Refills: 3 | Status: SHIPPED | OUTPATIENT
Start: 2024-02-26

## 2024-02-26 RX ORDER — CITALOPRAM 20 MG/1
20 TABLET ORAL NIGHTLY
Qty: 90 TABLET | Refills: 3 | Status: SHIPPED | OUTPATIENT
Start: 2024-02-26

## 2024-02-26 RX ORDER — SIMVASTATIN 40 MG
TABLET ORAL
Qty: 90 TABLET | Refills: 3 | Status: SHIPPED | OUTPATIENT
Start: 2024-02-26

## 2024-02-26 RX ORDER — AMITRIPTYLINE HYDROCHLORIDE 25 MG/1
12.5 TABLET, FILM COATED ORAL NIGHTLY
Qty: 90 TABLET | Refills: 3 | Status: SHIPPED | OUTPATIENT
Start: 2024-02-26

## 2024-02-26 RX ORDER — ATENOLOL 25 MG/1
TABLET ORAL
Qty: 90 TABLET | Refills: 3 | Status: SHIPPED | OUTPATIENT
Start: 2024-02-26

## 2024-02-26 RX ORDER — MONTELUKAST SODIUM 4 MG/1
1 TABLET, CHEWABLE ORAL DAILY
Qty: 90 TABLET | Refills: 3 | Status: SHIPPED | OUTPATIENT
Start: 2024-02-26

## 2024-02-26 RX ORDER — POTASSIUM CHLORIDE 750 MG/1
10 TABLET, FILM COATED, EXTENDED RELEASE ORAL DAILY
Qty: 90 TABLET | Refills: 3 | Status: SHIPPED | OUTPATIENT
Start: 2024-02-26

## 2024-02-26 SDOH — ECONOMIC STABILITY: INCOME INSECURITY: HOW HARD IS IT FOR YOU TO PAY FOR THE VERY BASICS LIKE FOOD, HOUSING, MEDICAL CARE, AND HEATING?: NOT VERY HARD

## 2024-02-26 SDOH — ECONOMIC STABILITY: FOOD INSECURITY: WITHIN THE PAST 12 MONTHS, YOU WORRIED THAT YOUR FOOD WOULD RUN OUT BEFORE YOU GOT MONEY TO BUY MORE.: NEVER TRUE

## 2024-02-26 SDOH — ECONOMIC STABILITY: FOOD INSECURITY: WITHIN THE PAST 12 MONTHS, THE FOOD YOU BOUGHT JUST DIDN'T LAST AND YOU DIDN'T HAVE MONEY TO GET MORE.: NEVER TRUE

## 2024-02-26 ASSESSMENT — PATIENT HEALTH QUESTIONNAIRE - PHQ9
8. MOVING OR SPEAKING SO SLOWLY THAT OTHER PEOPLE COULD HAVE NOTICED. OR THE OPPOSITE, BEING SO FIGETY OR RESTLESS THAT YOU HAVE BEEN MOVING AROUND A LOT MORE THAN USUAL: 0
SUM OF ALL RESPONSES TO PHQ QUESTIONS 1-9: 0
1. LITTLE INTEREST OR PLEASURE IN DOING THINGS: 0
6. FEELING BAD ABOUT YOURSELF - OR THAT YOU ARE A FAILURE OR HAVE LET YOURSELF OR YOUR FAMILY DOWN: 0
10. IF YOU CHECKED OFF ANY PROBLEMS, HOW DIFFICULT HAVE THESE PROBLEMS MADE IT FOR YOU TO DO YOUR WORK, TAKE CARE OF THINGS AT HOME, OR GET ALONG WITH OTHER PEOPLE: 0
SUM OF ALL RESPONSES TO PHQ QUESTIONS 1-9: 0
5. POOR APPETITE OR OVEREATING: 0
9. THOUGHTS THAT YOU WOULD BE BETTER OFF DEAD, OR OF HURTING YOURSELF: 0
7. TROUBLE CONCENTRATING ON THINGS, SUCH AS READING THE NEWSPAPER OR WATCHING TELEVISION: 0
SUM OF ALL RESPONSES TO PHQ9 QUESTIONS 1 & 2: 0
SUM OF ALL RESPONSES TO PHQ QUESTIONS 1-9: 0
4. FEELING TIRED OR HAVING LITTLE ENERGY: 0
3. TROUBLE FALLING OR STAYING ASLEEP: 0
2. FEELING DOWN, DEPRESSED OR HOPELESS: 0
SUM OF ALL RESPONSES TO PHQ QUESTIONS 1-9: 0

## 2024-02-26 NOTE — PROGRESS NOTES
[facility] PR[facility]  Shannon Mcelroy M.D.  [address] [place] [id number]  Phone:  [phone]  Fax:  [phone]    CHIEF COMPLAINT:  Chief Complaint   Patient presents with    Results     Patient recently had lab work done, would like to discuss results       Hypertension     Patient does check blood pressure at home, reports their blood pressure has been good. Patient takes medications as prescribed. Denies chest pain, shortness of breath or headaches.         Cholesterol Problem     Patient is compliant with medication use for cholesterol problem. Denies adverse effects of medication use.           HISTORY OF PRESENT ILLNESS:  Ms. Pineda is a 89 y.o. female  who presents for follow up. She has been doing well.     Her blood sugar have been good.  Her last A1c was 5.6.  She is controlling her blood sugars with diet.  Patient states that she stopped eating sugar in December 2022.    Her blood pressure is under control.  She takes medications daily as prescribed.    She has chronic thrombocytopenia.  She denies any bleeding.    Patient states that she recently saw her dermatologist.  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  Patient said it looked like a tiny pimple.  She is scheduled to have more surgery on April 1.    No other complaints. Taking medications as prescribed. Medications reviewed and updated.     HISTORY:  Allergies   Allergen Reactions    Ace Inhibitors Other (See Comments)    Acetaminophen     Amlodipine Other (See Comments)     GI    Amoxicillin Other (See Comments)    Codeine Other (See Comments)    Erythromycin Other (See Comments)    Hydrocodone     Penicillins Other (See Comments)    Sulfa Antibiotics Other (See Comments)       REVIEW OF SYSTEMS:  Review of systems is as indicated in HPI otherwise negative.    PHYSICAL EXAM:  Vital Signs -   Visit Vitals  /71   Pulse 58   Temp 97.6 °F (36.4 °C) (Temporal)   Ht 1.549 m (5' 1\")   Wt 51.6 kg

## 2024-07-10 ENCOUNTER — TELEPHONE (OUTPATIENT)
Dept: PRIMARY CARE CLINIC | Facility: CLINIC | Age: 89
End: 2024-07-10

## 2024-07-10 NOTE — TELEPHONE ENCOUNTER
Tamiko from [facility] called and wanted to know if Dr. NICHOLS would sign orders.  She ill be faxing them over shortly.

## 2024-07-12 ENCOUNTER — TELEPHONE (OUTPATIENT)
Dept: PRIMARY CARE CLINIC | Facility: CLINIC | Age: 89
End: 2024-07-12

## 2024-07-12 DIAGNOSIS — R09.2 RESPIRATORY ARREST (HCC): Primary | ICD-10-CM

## 2024-07-12 DIAGNOSIS — R79.81 LOW OXYGEN SATURATION: ICD-10-CM

## 2024-07-12 NOTE — TELEPHONE ENCOUNTER
Daughter (Leslie) called after hours to request oxygen at 3:20 pm on Friday afternoon. Mother had sugery on 07/08/2024 and was d/c'd yesterday. She reports that they were \"iffy\" about sending her home with oxygen. Ultimately, the provider decided that it was not medically necessary per daughters report. She reports that today her mother vomited this AM and feels weak. She reports that her mother desats when she ambulates 88% approx. She was informed that [employer] and [employer] may be able to help but I cannot guarantee an emergency supply of oxygen at this time. I advised her to contact these companies to see if they are able to get her set up quickly. In addition, she is advised to go back to ER if concerns arise. She has been informed to contact neurosurgeon as well. The daughter states \"I have been trying to reach him for an hour and it is so hard to get ahold of anybody\".     Per notes, [facility] requested orders (unsure of what orders) on 07/10 as well.

## 2024-07-12 NOTE — TELEPHONE ENCOUNTER
Order for o2 sent to [facility] ([facility] in [place]) emailed to andrew. Daughter notified       [phone]

## 2024-07-17 ENCOUNTER — TELEPHONE (OUTPATIENT)
Dept: PRIMARY CARE CLINIC | Facility: CLINIC | Age: 89
End: 2024-07-17

## 2024-07-17 ENCOUNTER — TELEPHONE (OUTPATIENT)
Age: 89
End: 2024-07-17

## 2024-07-17 NOTE — TELEPHONE ENCOUNTER
Patient's daughter called to speak to dr. Suárez regarding patient's post hos follow up. She was made aware that he is not taking any new patients, daughter still wants to talk to you regarding pt's eliquis

## 2024-07-17 NOTE — TELEPHONE ENCOUNTER
Spoke to pt's EC, Leslie Verduzco, and she states pt was put on Eliquis while in the hospital at [facility] and does not believe that pt needs this. She is requesting that appt be made with Dr. Suárez. I explained that Dr. Suárez is not currently scheduling new patients but I can get the pt in with another provider. She declined and asked that I send a message to Dr. Suárez before scheduling with another provider. I told her I will call back after speaking with him.

## 2024-07-17 NOTE — TELEPHONE ENCOUNTER
Patient was most recently hospitalized in [facility] and she has been released from the Hospital and her daughter stated that the hospital sent a referral to [facility] for her to have a CNA but [facility] does not have CNA'a they would like a referral sent to [facility].   She has already been scheduled for a hospital follow up.

## 2024-07-18 NOTE — TELEPHONE ENCOUNTER
Per Medicare guidelines, patient must have OV within 90 days. Daughter notified referral will be made tomorrow at patients office visit.

## 2024-07-19 ENCOUNTER — OFFICE VISIT (OUTPATIENT)
Dept: PRIMARY CARE CLINIC | Facility: CLINIC | Age: 89
End: 2024-07-19

## 2024-07-19 VITALS
DIASTOLIC BLOOD PRESSURE: 58 MMHG | OXYGEN SATURATION: 95 % | WEIGHT: 105.5 LBS | HEIGHT: 61 IN | TEMPERATURE: 98.1 F | BODY MASS INDEX: 19.92 KG/M2 | HEART RATE: 68 BPM | SYSTOLIC BLOOD PRESSURE: 104 MMHG

## 2024-07-19 DIAGNOSIS — Z86.39 HISTORY OF DEHYDRATION: ICD-10-CM

## 2024-07-19 DIAGNOSIS — Z78.9 DECREASED ACTIVITIES OF DAILY LIVING (ADL): ICD-10-CM

## 2024-07-19 DIAGNOSIS — I48.3 TYPICAL ATRIAL FLUTTER (HCC): ICD-10-CM

## 2024-07-19 DIAGNOSIS — Z09 HOSPITAL DISCHARGE FOLLOW-UP: Primary | ICD-10-CM

## 2024-07-19 DIAGNOSIS — Z79.899 ENCOUNTER FOR LONG-TERM (CURRENT) USE OF MEDICATIONS: ICD-10-CM

## 2024-07-19 DIAGNOSIS — Z98.890 S/P LUMBAR LAMINECTOMY: ICD-10-CM

## 2024-07-19 DIAGNOSIS — N18.31 TYPE 2 DIABETES MELLITUS WITH STAGE 3A CHRONIC KIDNEY DISEASE, WITHOUT LONG-TERM CURRENT USE OF INSULIN (HCC): ICD-10-CM

## 2024-07-19 DIAGNOSIS — E11.22 TYPE 2 DIABETES MELLITUS WITH STAGE 3A CHRONIC KIDNEY DISEASE, WITHOUT LONG-TERM CURRENT USE OF INSULIN (HCC): ICD-10-CM

## 2024-07-19 DIAGNOSIS — I48.91 NEW ONSET ATRIAL FIBRILLATION (HCC): ICD-10-CM

## 2024-07-19 DIAGNOSIS — I15.0 RENOVASCULAR HYPERTENSION: ICD-10-CM

## 2024-07-19 DIAGNOSIS — Z86.39 HISTORY OF METABOLIC ACIDOSIS: ICD-10-CM

## 2024-07-19 PROBLEM — M51.26 HNP (HERNIATED NUCLEUS PULPOSUS), LUMBAR: Status: ACTIVE | Noted: 2024-07-07

## 2024-07-19 PROBLEM — R79.89 ELEVATED TROPONIN: Status: ACTIVE | Noted: 2024-07-13

## 2024-07-19 PROBLEM — I25.10 CORONARY ATHEROSCLEROSIS: Status: ACTIVE | Noted: 2024-07-13

## 2024-07-19 PROBLEM — I21.4 NSTEMI (NON-ST ELEVATED MYOCARDIAL INFARCTION) (HCC): Status: ACTIVE | Noted: 2024-07-13

## 2024-07-19 PROBLEM — I48.20 CHRONIC ATRIAL FIBRILLATION, UNSPECIFIED (HCC): Status: ACTIVE | Noted: 2024-07-19

## 2024-07-19 RX ORDER — ASPIRIN 325 MG
325 TABLET ORAL DAILY
Qty: 90 TABLET | Refills: 3 | Status: SHIPPED | OUTPATIENT
Start: 2024-07-19

## 2024-07-19 RX ORDER — UREA 10 %
3 LOTION (ML) TOPICAL NIGHTLY
COMMUNITY

## 2024-07-19 NOTE — PROGRESS NOTES
[facility] PRIMARY [facility]  Shannon Mcelroy M.D.  [address] [place] [address]  Phone:  [phone]  Fax:  [phone]    CHIEF COMPLAINT:  Chief Complaint   Patient presents with    Follow-Up from Hospital     Patient was hospitalized from 7/12/24-7/14/24 for syncope and fatigue to [facility]. She had had a laminectomy 4 days prior to hospitalization. Patient was diagnosed with atrial fib and a pulmonary embolism. (Daughter states this is incorrect, however chart note states she does have one). Patient does not currently have a cardiologist. She was also sent home on oxygen but patient just uses it prn.     Referral - General     Family would like a referral to [facility] home health care with a CNA. Daughter wants a referral to [facility] [employer] [facility]. Patient is currently living with her daughter but plans to move back home asa.       Hypertension     Daughter states her blood pressure has been running low so she has been holding losartan hctz for the past 3 days         HISTORY OF PRESENT ILLNESS:  Ms. Pineda is a 89 y.o. female  who presents for follow up. The patient, Alfred Pineda, was hospitalized from July 12th to the 14th due to metabolic acidosis following a laminectomy. She experienced atrial fibrillation with a heart rate of 160 bpm, which was managed with digoxin, lopressor, and a cortisone drip. She is currently on Eliquis twice a day for a possible small blood clot in the base of her right lower lung. The patient has not taken losartan in three days due to low blood pressure and has lost eight pounds since the last visit.    Prior to the laminectomy, Alfred experienced severe lower back pain, leg pain, and urinary retention due to a herniated disc pressing on nerves. Both legs were affected. The symptoms have improved since the surgery.    Alfred reports no chest pain or shortness of breath but has some shoulder pain on one side. She has been applying cream on the neck and bilateral upper

## 2024-07-23 ENCOUNTER — TELEPHONE (OUTPATIENT)
Dept: PRIMARY CARE CLINIC | Facility: CLINIC | Age: 89
End: 2024-07-23

## 2024-08-18 PROBLEM — R79.89 ELEVATED TROPONIN: Status: RESOLVED | Noted: 2024-07-13 | Resolved: 2024-08-18

## 2024-08-19 ENCOUNTER — LAB (OUTPATIENT)
Dept: PRIMARY CARE CLINIC | Facility: CLINIC | Age: 89
End: 2024-08-19

## 2024-08-19 DIAGNOSIS — E55.9 VITAMIN D DEFICIENCY: ICD-10-CM

## 2024-08-19 DIAGNOSIS — E78.2 MIXED HYPERLIPIDEMIA: ICD-10-CM

## 2024-08-19 DIAGNOSIS — E11.22 TYPE 2 DIABETES MELLITUS WITH DIABETIC CHRONIC KIDNEY DISEASE, UNSPECIFIED CKD STAGE, UNSPECIFIED WHETHER LONG TERM INSULIN USE (HCC): Primary | ICD-10-CM

## 2024-08-19 DIAGNOSIS — Z79.899 ENCOUNTER FOR LONG-TERM (CURRENT) USE OF MEDICATIONS: ICD-10-CM

## 2024-08-19 LAB
25(OH)D3 SERPL-MCNC: 75.4 NG/ML (ref 30–100)
ALBUMIN SERPL-MCNC: 3.9 G/DL (ref 3.2–4.6)
ALBUMIN/GLOB SERPL: 1.3 (ref 1–1.9)
ALP SERPL-CCNC: 71 U/L (ref 35–104)
ALT SERPL-CCNC: 26 U/L (ref 12–65)
ANION GAP SERPL CALC-SCNC: 9 MMOL/L (ref 9–18)
AST SERPL-CCNC: 40 U/L (ref 15–37)
BASOPHILS # BLD: 0.1 K/UL (ref 0–0.2)
BASOPHILS NFR BLD: 1 % (ref 0–2)
BILIRUB SERPL-MCNC: 0.5 MG/DL (ref 0–1.2)
BUN SERPL-MCNC: 17 MG/DL (ref 8–23)
CALCIUM SERPL-MCNC: 9.7 MG/DL (ref 8.8–10.2)
CHLORIDE SERPL-SCNC: 104 MMOL/L (ref 98–107)
CHOLEST SERPL-MCNC: 111 MG/DL (ref 0–200)
CO2 SERPL-SCNC: 28 MMOL/L (ref 20–28)
CREAT SERPL-MCNC: 1.01 MG/DL (ref 0.6–1.1)
DIFFERENTIAL METHOD BLD: ABNORMAL
EOSINOPHIL # BLD: 0.3 K/UL (ref 0–0.8)
EOSINOPHIL NFR BLD: 6 % (ref 0.5–7.8)
ERYTHROCYTE [DISTWIDTH] IN BLOOD BY AUTOMATED COUNT: 13.6 % (ref 11.9–14.6)
EST. AVERAGE GLUCOSE BLD GHB EST-MCNC: 113 MG/DL
GLOBULIN SER CALC-MCNC: 3 G/DL (ref 2.3–3.5)
GLUCOSE SERPL-MCNC: 99 MG/DL (ref 70–99)
HBA1C MFR BLD: 5.6 % (ref 0–5.6)
HCT VFR BLD AUTO: 46.9 % (ref 35.8–46.3)
HDLC SERPL-MCNC: 49 MG/DL (ref 40–60)
HDLC SERPL: 2.3 (ref 0–5)
HGB BLD-MCNC: 14.7 G/DL (ref 11.7–15.4)
IMM GRANULOCYTES # BLD AUTO: 0 K/UL (ref 0–0.5)
IMM GRANULOCYTES NFR BLD AUTO: 0 % (ref 0–5)
LDLC SERPL CALC-MCNC: 39 MG/DL (ref 0–100)
LYMPHOCYTES # BLD: 1 K/UL (ref 0.5–4.6)
LYMPHOCYTES NFR BLD: 22 % (ref 13–44)
MCH RBC QN AUTO: 30.2 PG (ref 26.1–32.9)
MCHC RBC AUTO-ENTMCNC: 31.3 G/DL (ref 31.4–35)
MCV RBC AUTO: 96.5 FL (ref 82–102)
MONOCYTES # BLD: 0.5 K/UL (ref 0.1–1.3)
MONOCYTES NFR BLD: 11 % (ref 4–12)
NEUTS SEG # BLD: 2.9 K/UL (ref 1.7–8.2)
NEUTS SEG NFR BLD: 60 % (ref 43–78)
NRBC # BLD: 0 K/UL (ref 0–0.2)
PLATELET # BLD AUTO: 124 K/UL (ref 150–450)
PMV BLD AUTO: 10.5 FL (ref 9.4–12.3)
POTASSIUM SERPL-SCNC: 4.2 MMOL/L (ref 3.5–5.1)
PROT SERPL-MCNC: 6.9 G/DL (ref 6.3–8.2)
RBC # BLD AUTO: 4.86 M/UL (ref 4.05–5.2)
SODIUM SERPL-SCNC: 141 MMOL/L (ref 136–145)
TRIGL SERPL-MCNC: 115 MG/DL (ref 0–150)
VLDLC SERPL CALC-MCNC: 23 MG/DL (ref 6–23)
WBC # BLD AUTO: 4.8 K/UL (ref 4.3–11.1)

## 2024-08-26 ENCOUNTER — OFFICE VISIT (OUTPATIENT)
Dept: PRIMARY CARE CLINIC | Facility: CLINIC | Age: 89
End: 2024-08-26

## 2024-08-26 VITALS
SYSTOLIC BLOOD PRESSURE: 138 MMHG | WEIGHT: 112.7 LBS | HEIGHT: 61 IN | DIASTOLIC BLOOD PRESSURE: 67 MMHG | TEMPERATURE: 97.2 F | BODY MASS INDEX: 21.28 KG/M2 | OXYGEN SATURATION: 96 % | HEART RATE: 59 BPM

## 2024-08-26 DIAGNOSIS — Z98.890 S/P LUMBAR LAMINECTOMY: ICD-10-CM

## 2024-08-26 DIAGNOSIS — E11.22 TYPE 2 DIABETES MELLITUS WITH STAGE 3A CHRONIC KIDNEY DISEASE, WITHOUT LONG-TERM CURRENT USE OF INSULIN (HCC): ICD-10-CM

## 2024-08-26 DIAGNOSIS — N18.31 TYPE 2 DIABETES MELLITUS WITH STAGE 3A CHRONIC KIDNEY DISEASE, WITHOUT LONG-TERM CURRENT USE OF INSULIN (HCC): ICD-10-CM

## 2024-08-26 DIAGNOSIS — Z79.899 ENCOUNTER FOR LONG-TERM (CURRENT) USE OF MEDICATIONS: ICD-10-CM

## 2024-08-26 DIAGNOSIS — Z00.00 MEDICARE ANNUAL WELLNESS VISIT, SUBSEQUENT: Primary | ICD-10-CM

## 2024-08-26 DIAGNOSIS — I15.0 RENOVASCULAR HYPERTENSION: ICD-10-CM

## 2024-08-26 ASSESSMENT — PATIENT HEALTH QUESTIONNAIRE - PHQ9
8. MOVING OR SPEAKING SO SLOWLY THAT OTHER PEOPLE COULD HAVE NOTICED. OR THE OPPOSITE, BEING SO FIGETY OR RESTLESS THAT YOU HAVE BEEN MOVING AROUND A LOT MORE THAN USUAL: NOT AT ALL
SUM OF ALL RESPONSES TO PHQ QUESTIONS 1-9: 1
9. THOUGHTS THAT YOU WOULD BE BETTER OFF DEAD, OR OF HURTING YOURSELF: NOT AT ALL
SUM OF ALL RESPONSES TO PHQ QUESTIONS 1-9: 1
2. FEELING DOWN, DEPRESSED OR HOPELESS: NOT AT ALL
SUM OF ALL RESPONSES TO PHQ QUESTIONS 1-9: 1
6. FEELING BAD ABOUT YOURSELF - OR THAT YOU ARE A FAILURE OR HAVE LET YOURSELF OR YOUR FAMILY DOWN: NOT AT ALL
1. LITTLE INTEREST OR PLEASURE IN DOING THINGS: NOT AT ALL
5. POOR APPETITE OR OVEREATING: NOT AT ALL
7. TROUBLE CONCENTRATING ON THINGS, SUCH AS READING THE NEWSPAPER OR WATCHING TELEVISION: NOT AT ALL
10. IF YOU CHECKED OFF ANY PROBLEMS, HOW DIFFICULT HAVE THESE PROBLEMS MADE IT FOR YOU TO DO YOUR WORK, TAKE CARE OF THINGS AT HOME, OR GET ALONG WITH OTHER PEOPLE: NOT DIFFICULT AT ALL
3. TROUBLE FALLING OR STAYING ASLEEP: SEVERAL DAYS
4. FEELING TIRED OR HAVING LITTLE ENERGY: NOT AT ALL
SUM OF ALL RESPONSES TO PHQ9 QUESTIONS 1 & 2: 0
SUM OF ALL RESPONSES TO PHQ QUESTIONS 1-9: 1

## 2024-08-26 ASSESSMENT — LIFESTYLE VARIABLES
HOW MANY STANDARD DRINKS CONTAINING ALCOHOL DO YOU HAVE ON A TYPICAL DAY: PATIENT DOES NOT DRINK
HOW OFTEN DO YOU HAVE A DRINK CONTAINING ALCOHOL: NEVER

## 2024-08-26 NOTE — PROGRESS NOTES
Medicare Annual Wellness Visit    Alfred Pineda is here for Medicare AWV (Here for medicare wellness exam ) and Results (Patient recently had lab work done, would like to discuss results /)    Assessment & Plan   Medicare annual wellness visit, subsequent  Type 2 diabetes mellitus with stage 3a chronic kidney disease, without long-term current use of insulin (HCC)  Renovascular hypertension  S/P lumbar laminectomy  Encounter for long-term (current) use of medications    Recommendations for Preventive Services Due: see orders and patient instructions/AVS.  Recommended screening schedule for the next 5-10 years is provided to the patient in written form: see Patient Instructions/AVS.     Return in about 6 months (around 2/26/2025).     Subjective       Patient's complete Health Risk Assessment and screening values have been reviewed and are found in Flowsheets. The following problems were reviewed today and where indicated follow up appointments were made and/or referrals ordered.    Positive Risk Factor Screenings with Interventions:                       ADL's:   Patient reports needing help with:  Select all that apply: (!) Banking/Finances, Transportation  Interventions:  Patient declined any further interventions or treatment              Objective   Vitals:    08/26/24 1457   BP: 138/67   Pulse: 59   Temp: 97.2 °F (36.2 °C)   TempSrc: Temporal   SpO2: 96%   Weight: 51.1 kg (112 lb 11.2 oz)   Height: 1.549 m (5' 1\")      Body mass index is 21.29 kg/m².        General Appearance: alert and oriented to person, place and time, well developed and well- nourished, in no acute distress  Skin: warm and dry, no rash or erythema  Head: normocephalic and atraumatic  Eyes: pupils equal, round, and reactive to light, extraocular eye movements intact, conjunctivae normal  ENT: tympanic membrane, external ear and ear canal normal bilaterally, nose without deformity, nasal mucosa and turbinates normal without polyps  Neck:  Shannon Mcelroy MD   potassium chloride (KLOR-CON) 10 MEQ extended release tablet Take 1 tablet by mouth daily Yes Shannon Mcelroy MD   simvastatin (ZOCOR) 40 MG tablet TAKE 1/2 TABLET BY MOUTH EVERY EVENING Yes Shannon Mcelroy MD   OneTouch Delica Lancets 30G MISC by Other route Yes ProviderTuan MD   Multiple Vitamins-Minerals (PRESERVISION AREDS 2 PO) Take by mouth 2 times daily Yes Provider, MD Tuan   Lancets MISC Check blood sugars BID. E11.9 Yes Automatic Reconciliation, Ar   latanoprost (XALATAN) 0.005 % ophthalmic solution PUT 1 DROP INTO BOTH EYES AT BEDTIME Yes Automatic Reconciliation, Ar   timolol (TIMOPTIC-XE) 0.5 % ophthalmic gel-forming Apply 1 drop to eye daily Yes Automatic Reconciliation, Ar       CareTeam (Including outside providers/suppliers regularly involved in providing care):   Patient Care Team:  Shannon Mcelroy MD as PCP - General (Family Medicine)  Shannon Mcelroy MD as PCP - Empaneled Provider  Isaura Barry AuD as Audiologist (Audiology)      Reviewed and updated this visit:  Tobacco  Allergies  Meds  Problems  Med Hx  Surg Hx  Soc Hx  Fam Hx

## 2024-08-26 NOTE — PATIENT INSTRUCTIONS
your family, and various assessments and screenings as appropriate.    After reviewing your medical record and screening and assessments performed today your provider may have ordered immunizations, labs, imaging, and/or referrals for you.  A list of these orders (if applicable) as well as your Preventive Care list are included within your After Visit Summary for your review.    Other Preventive Recommendations:    A preventive eye exam performed by an eye specialist is recommended every 1-2 years to screen for glaucoma; cataracts, macular degeneration, and other eye disorders.  A preventive dental visit is recommended every 6 months.  Try to get at least 150 minutes of exercise per week or 10,000 steps per day on a pedometer .  Order or download the FREE \"Exercise & Physical Activity: Your Everyday Guide\" from [employer] [place] [employer]. Call 1-753.655.1995 or search [employer] [place] [employer] online.  You need 8721-5919 mg of calcium and 4745-1541 IU of vitamin D per day. It is possible to meet your calcium requirement with diet alone, but a vitamin D supplement is usually necessary to meet this goal.  When exposed to the sun, use a sunscreen that protects against both UVA and UVB radiation with an SPF of 30 or greater. Reapply every 2 to 3 hours or after sweating, drying off with a towel, or swimming.  Always wear a seat belt when traveling in a car. Always wear a helmet when riding a bicycle or motorcycle.

## 2024-08-26 NOTE — PROGRESS NOTES
[facility] PRIMARY CARE  Shannon Mcelroy M.D.  [address] [place] [id number]  Phone:  [phone]  Fax:  [phone]    CHIEF COMPLAINT:  Chief Complaint   Patient presents with    Medicare AWV     Here for medicare wellness exam     Results     Patient recently had lab work done, would like to discuss results           HISTORY OF PRESENT ILLNESS:  Ms. Pineda is a 89 y.o. female  who presents for follow up.  Ms. Alfred Pineda presents today for a follow-up visit after recent back surgery. She reports that the surgery was rough and she is still under the doctor's care. She is currently undergoing therapy.  She will follow up with her surgeon next week.  Ms. Pineda had two ruptured discs pressing on her nerves, which have since been addressed with the surgery. She states that her pain has improved since the procedure.     Her blood pressure is under control. She takes her medications daily as prescribed. Denies chest pain, shortness of breath, headaches or blurred vision.    Her blood sugars have been good. Her last A1c was 5.6. She is controlling her blood sugars with diet and exercise.    No other complaints. Taking medications as prescribed. Medications reviewed and updated.     HISTORY:  Allergies   Allergen Reactions    Ace Inhibitors Other (See Comments)    Acetaminophen     Amlodipine Other (See Comments)     GI    Amoxicillin Other (See Comments)    Codeine Other (See Comments)    Erythromycin Other (See Comments)    Hydrocodone     Penicillins Other (See Comments)    Sulfa Antibiotics Other (See Comments)    Meperidine Rash       REVIEW OF SYSTEMS:  Review of systems is as indicated in HPI otherwise negative.    PHYSICAL EXAM:  Visit Vitals  /67   Pulse 59   Temp 97.2 °F (36.2 °C) (Temporal)   Ht 1.549 m (5' 1\")   Wt 51.1 kg (112 lb 11.2 oz)   SpO2 96%   BMI 21.29 kg/m²        Physical Exam  Vitals and nursing note reviewed.   Constitutional:       Appearance: Normal appearance.  mg/dL  Borderline High: 130-159, High: 160-189 mg/dL  Very High: Greater than or equal to 190 mg/dL      VLDL Cholesterol Calculated 08/19/2024 23  6 - 23 MG/DL Final    Chol/HDL Ratio 08/19/2024 2.3  0.0 - 5.0   Final    Hemoglobin A1C 08/19/2024 5.6  0 - 5.6 % Final    Comment: Reference Range  Normal       <5.7%  Prediabetes  5.7-6.4%  Diabetes     >6.4%      Estimated Avg Glucose 08/19/2024 113  mg/dL Final       IMPRESSION/PLAN     Diagnosis Orders   1. Type 2 diabetes mellitus with stage 3a chronic kidney disease, without long-term current use of insulin (HCC)        2. Renovascular hypertension        3. S/P lumbar laminectomy        4. Encounter for long-term (current) use of medications            Follow up and Dispositions:  Return in about 6 months (around 2/26/2025).     Patient is stable on medications and will continue current medications.  Reviewed medications and side effects in detail.  Reviewed most recent labs.  Reviewed diet, exercise and weight control.  Cardiovascular risks and recommendations reviewed.  Patient encouraged to follow a diabetic/low sodium diet.  Use of aspirin to prevent MIs and TIAs discussed.   Patient will check blood sugars once daily.    I have reviewed the patient's past medical history, social history and family history and vitals.  We have discussed treatment plan and follow up and given patient instructions.  Patient's questions are answered and we will follow up as indicated.      Shannon Mcelroy MD    Dictated using voice recognition software. Proofread, but unrecognized voice recognition errors may exist.

## 2025-01-25 DIAGNOSIS — F32.1 MAJOR DEPRESSIVE DISORDER, SINGLE EPISODE, MODERATE (HCC): ICD-10-CM

## 2025-01-25 DIAGNOSIS — F41.9 ANXIETY: ICD-10-CM

## 2025-01-26 RX ORDER — CITALOPRAM HYDROBROMIDE 20 MG/1
20 TABLET ORAL
Qty: 90 TABLET | Refills: 3 | OUTPATIENT
Start: 2025-01-26

## 2025-03-13 ENCOUNTER — OFFICE VISIT (OUTPATIENT)
Dept: PRIMARY CARE CLINIC | Facility: CLINIC | Age: 89
End: 2025-03-13

## 2025-03-13 VITALS
WEIGHT: 111.2 LBS | TEMPERATURE: 97.1 F | HEIGHT: 61 IN | BODY MASS INDEX: 20.99 KG/M2 | HEART RATE: 64 BPM | DIASTOLIC BLOOD PRESSURE: 62 MMHG | SYSTOLIC BLOOD PRESSURE: 136 MMHG | OXYGEN SATURATION: 98 %

## 2025-03-13 DIAGNOSIS — F41.9 ANXIETY: ICD-10-CM

## 2025-03-13 DIAGNOSIS — I15.0 RENOVASCULAR HYPERTENSION: ICD-10-CM

## 2025-03-13 DIAGNOSIS — K21.9 GASTROESOPHAGEAL REFLUX DISEASE WITHOUT ESOPHAGITIS: ICD-10-CM

## 2025-03-13 DIAGNOSIS — F32.1 MAJOR DEPRESSIVE DISORDER, SINGLE EPISODE, MODERATE (HCC): ICD-10-CM

## 2025-03-13 DIAGNOSIS — E55.9 VITAMIN D DEFICIENCY: ICD-10-CM

## 2025-03-13 DIAGNOSIS — F51.01 PRIMARY INSOMNIA: ICD-10-CM

## 2025-03-13 DIAGNOSIS — E11.22 TYPE 2 DIABETES MELLITUS WITH STAGE 3A CHRONIC KIDNEY DISEASE, WITHOUT LONG-TERM CURRENT USE OF INSULIN (HCC): Primary | ICD-10-CM

## 2025-03-13 DIAGNOSIS — E78.2 MIXED HYPERLIPIDEMIA: ICD-10-CM

## 2025-03-13 DIAGNOSIS — N18.31 TYPE 2 DIABETES MELLITUS WITH STAGE 3A CHRONIC KIDNEY DISEASE, WITHOUT LONG-TERM CURRENT USE OF INSULIN (HCC): Primary | ICD-10-CM

## 2025-03-13 DIAGNOSIS — Z79.899 ENCOUNTER FOR LONG-TERM (CURRENT) USE OF MEDICATIONS: ICD-10-CM

## 2025-03-13 DIAGNOSIS — I48.3 TYPICAL ATRIAL FLUTTER (HCC): ICD-10-CM

## 2025-03-13 LAB
25(OH)D3 SERPL-MCNC: 54.9 NG/ML (ref 30–100)
ALBUMIN SERPL-MCNC: 3.9 G/DL (ref 3.2–4.6)
ALBUMIN/GLOB SERPL: 1.4 (ref 1–1.9)
ALP SERPL-CCNC: 68 U/L (ref 35–104)
ALT SERPL-CCNC: 53 U/L (ref 8–45)
ANION GAP SERPL CALC-SCNC: 12 MMOL/L (ref 7–16)
AST SERPL-CCNC: 49 U/L (ref 15–37)
BILIRUB SERPL-MCNC: 0.8 MG/DL (ref 0–1.2)
BUN SERPL-MCNC: 30 MG/DL (ref 8–23)
CALCIUM SERPL-MCNC: 9.3 MG/DL (ref 8.8–10.2)
CHLORIDE SERPL-SCNC: 104 MMOL/L (ref 98–107)
CHOLEST SERPL-MCNC: 105 MG/DL (ref 0–200)
CO2 SERPL-SCNC: 24 MMOL/L (ref 20–29)
CREAT SERPL-MCNC: 1.01 MG/DL (ref 0.6–1.1)
CREAT UR-MCNC: 90.2 MG/DL (ref 28–217)
EST. AVERAGE GLUCOSE BLD GHB EST-MCNC: 114 MG/DL
GLOBULIN SER CALC-MCNC: 2.8 G/DL (ref 2.3–3.5)
GLUCOSE SERPL-MCNC: 85 MG/DL (ref 70–99)
HBA1C MFR BLD: 5.6 % (ref 0–5.6)
HDLC SERPL-MCNC: 44 MG/DL (ref 40–60)
HDLC SERPL: 2.4 (ref 0–5)
LDLC SERPL CALC-MCNC: 30 MG/DL (ref 0–100)
MICROALBUMIN UR-MCNC: <1.2 MG/DL (ref 0–20)
MICROALBUMIN/CREAT UR-RTO: NORMAL MG/G (ref 0–30)
POTASSIUM SERPL-SCNC: 4.4 MMOL/L (ref 3.5–5.1)
PROT SERPL-MCNC: 6.6 G/DL (ref 6.3–8.2)
SODIUM SERPL-SCNC: 140 MMOL/L (ref 136–145)
TRIGL SERPL-MCNC: 156 MG/DL (ref 0–150)
VLDLC SERPL CALC-MCNC: 31 MG/DL (ref 6–23)

## 2025-03-13 RX ORDER — CITALOPRAM HYDROBROMIDE 20 MG/1
20 TABLET ORAL NIGHTLY
Qty: 90 TABLET | Refills: 3 | Status: SHIPPED | OUTPATIENT
Start: 2025-03-13

## 2025-03-13 RX ORDER — POTASSIUM CHLORIDE 750 MG/1
10 TABLET, EXTENDED RELEASE ORAL DAILY
Qty: 90 TABLET | Refills: 3 | Status: SHIPPED | OUTPATIENT
Start: 2025-03-13

## 2025-03-13 RX ORDER — CLINDAMYCIN PHOSPHATE 10 UG/ML
LOTION TOPICAL
Qty: 60 G | Refills: 5 | Status: SHIPPED | OUTPATIENT
Start: 2025-03-13

## 2025-03-13 RX ORDER — ATENOLOL 25 MG/1
TABLET ORAL
Qty: 90 TABLET | Refills: 3 | Status: SHIPPED | OUTPATIENT
Start: 2025-03-13

## 2025-03-13 RX ORDER — COLESTIPOL HYDROCHLORIDE 1 G/1
1 TABLET ORAL DAILY
Qty: 90 TABLET | Refills: 3 | Status: SHIPPED | OUTPATIENT
Start: 2025-03-13

## 2025-03-13 RX ORDER — SIMVASTATIN 40 MG
TABLET ORAL
Qty: 90 TABLET | Refills: 3 | Status: SHIPPED | OUTPATIENT
Start: 2025-03-13

## 2025-03-13 RX ORDER — OMEPRAZOLE 20 MG/1
20 CAPSULE, DELAYED RELEASE ORAL DAILY
Qty: 90 CAPSULE | Refills: 3 | Status: SHIPPED | OUTPATIENT
Start: 2025-03-13

## 2025-03-13 RX ORDER — ASPIRIN 325 MG
325 TABLET ORAL DAILY
Qty: 90 TABLET | Refills: 3 | Status: SHIPPED | OUTPATIENT
Start: 2025-03-13

## 2025-03-13 SDOH — ECONOMIC STABILITY: FOOD INSECURITY: WITHIN THE PAST 12 MONTHS, YOU WORRIED THAT YOUR FOOD WOULD RUN OUT BEFORE YOU GOT MONEY TO BUY MORE.: NEVER TRUE

## 2025-03-13 SDOH — ECONOMIC STABILITY: FOOD INSECURITY: WITHIN THE PAST 12 MONTHS, THE FOOD YOU BOUGHT JUST DIDN'T LAST AND YOU DIDN'T HAVE MONEY TO GET MORE.: NEVER TRUE

## 2025-03-13 ASSESSMENT — PATIENT HEALTH QUESTIONNAIRE - PHQ9
SUM OF ALL RESPONSES TO PHQ QUESTIONS 1-9: 0
8. MOVING OR SPEAKING SO SLOWLY THAT OTHER PEOPLE COULD HAVE NOTICED. OR THE OPPOSITE, BEING SO FIGETY OR RESTLESS THAT YOU HAVE BEEN MOVING AROUND A LOT MORE THAN USUAL: NOT AT ALL
SUM OF ALL RESPONSES TO PHQ QUESTIONS 1-9: 0
10. IF YOU CHECKED OFF ANY PROBLEMS, HOW DIFFICULT HAVE THESE PROBLEMS MADE IT FOR YOU TO DO YOUR WORK, TAKE CARE OF THINGS AT HOME, OR GET ALONG WITH OTHER PEOPLE: NOT DIFFICULT AT ALL
7. TROUBLE CONCENTRATING ON THINGS, SUCH AS READING THE NEWSPAPER OR WATCHING TELEVISION: NOT AT ALL
3. TROUBLE FALLING OR STAYING ASLEEP: NOT AT ALL
1. LITTLE INTEREST OR PLEASURE IN DOING THINGS: NOT AT ALL
2. FEELING DOWN, DEPRESSED OR HOPELESS: NOT AT ALL
9. THOUGHTS THAT YOU WOULD BE BETTER OFF DEAD, OR OF HURTING YOURSELF: NOT AT ALL
5. POOR APPETITE OR OVEREATING: NOT AT ALL
4. FEELING TIRED OR HAVING LITTLE ENERGY: NOT AT ALL
6. FEELING BAD ABOUT YOURSELF - OR THAT YOU ARE A FAILURE OR HAVE LET YOURSELF OR YOUR FAMILY DOWN: NOT AT ALL

## 2025-03-13 NOTE — PROGRESS NOTES
[facility] PRIMARY CARE  Shannon Mcelroy M.D.  [address] [place] [id number]  Phone:  [phone]  Fax:  [phone]    CHIEF COMPLAINT:  Chief Complaint   Patient presents with    Hypertension     Patient does not check blood pressure at home, Patient takes medications as prescribed. Denies chest pain, shortness of breath or headaches.       Cholesterol Problem     Patient is compliant with medication use for cholesterol problem. Denies adverse effects of medication use.       Depression     Patient states her depression meds are working well for her, denies symptoms of depression at this time.         HISTORY OF PRESENT ILLNESS:  Ms. Pineda is a 89 y.o. female  who presents for follow up. Medical History  - Hypertension  - Hypercholesterolemia  - Depression    Current and Past Medications and Supplements  - Medication for blood pressure  - Medication for cholesterol  - Depression medication    Social History  - Physical activity: Works in the yard, engages in gardening  - Social support: Has a daughter who wanted to accompany her to the appointment    Review of Systems  - Cardiovascular: No chest pain, no shortness of breath  - Gastrointestinal: No pain in the belly, no problems going to the bathroom  - Musculoskeletal: No swelling, no cramping in the legs        HISTORY:  Allergies   Allergen Reactions    Ace Inhibitors Other (See Comments)    Acetaminophen     Amlodipine Other (See Comments)     GI    Codeine Other (See Comments)    Erythromycin Other (See Comments)    Hydrocodone     Penicillins Other (See Comments)    Sulfa Antibiotics Other (See Comments)    Meperidine Rash       REVIEW OF SYSTEMS:  Review of systems is as indicated in HPI otherwise negative.    PHYSICAL EXAM:  Visit Vitals  /62   Pulse 64   Temp 97.1 °F (36.2 °C) (Temporal)   Ht 1.549 m (5' 1\")   Wt 50.4 kg (111 lb 3.2 oz)   SpO2 98%   BMI 21.01 kg/m²        Physical Exam  Vitals and nursing note reviewed.

## 2025-03-17 ENCOUNTER — TELEPHONE (OUTPATIENT)
Dept: PRIMARY CARE CLINIC | Facility: CLINIC | Age: 89
End: 2025-03-17

## 2025-03-17 DIAGNOSIS — Z79.899 ENCOUNTER FOR LONG-TERM (CURRENT) USE OF MEDICATIONS: Primary | ICD-10-CM

## 2025-03-17 NOTE — TELEPHONE ENCOUNTER
----- Message from NAKIA VIDAL RN sent at 3/17/2025  9:59 AM EDT -----  Regarding: FW: Lab Recollection    ----- Message -----  From: Linda Flor  Sent: 3/14/2025  11:55 AM EDT  To: [facility] Clinical [facility]  Subject: Lab Recollection                                 The following lab test(s) were not completed.  Lab tests:  CBC  Recollect reason: Quantity insufficient    Please consult with the ordering physician/APC if the tests are needed.    If this test requires recollection, please re-enter order in Epic within 24 hours of this notice and respond to this message as Client Services will contact the patient.     If this test DOES NOT require recollection, document this in Epic documentation only encounter.    If you need additional information, respond to this message and/or call [phone].    [facility] Client Services

## 2025-05-14 DIAGNOSIS — Z79.899 ENCOUNTER FOR LONG-TERM (CURRENT) USE OF MEDICATIONS: ICD-10-CM

## 2025-05-14 LAB
BASOPHILS # BLD: 0.05 K/UL (ref 0–0.2)
BASOPHILS NFR BLD: 0.7 % (ref 0–2)
DIFFERENTIAL METHOD BLD: ABNORMAL
EOSINOPHIL # BLD: 0.13 K/UL (ref 0–0.8)
EOSINOPHIL NFR BLD: 1.9 % (ref 0.5–7.8)
ERYTHROCYTE [DISTWIDTH] IN BLOOD BY AUTOMATED COUNT: 14.6 % (ref 11.9–14.6)
HCT VFR BLD AUTO: 46.4 % (ref 35.8–46.3)
HGB BLD-MCNC: 15.1 G/DL (ref 11.7–15.4)
IMM GRANULOCYTES # BLD AUTO: 0.01 K/UL (ref 0–0.5)
IMM GRANULOCYTES NFR BLD AUTO: 0.1 % (ref 0–5)
LYMPHOCYTES # BLD: 1.94 K/UL (ref 0.5–4.6)
LYMPHOCYTES NFR BLD: 29.1 % (ref 13–44)
MCH RBC QN AUTO: 29.5 PG (ref 26.1–32.9)
MCHC RBC AUTO-ENTMCNC: 32.5 G/DL (ref 31.4–35)
MCV RBC AUTO: 90.6 FL (ref 82–102)
MONOCYTES # BLD: 0.84 K/UL (ref 0.1–1.3)
MONOCYTES NFR BLD: 12.6 % (ref 4–12)
NEUTS SEG # BLD: 3.7 K/UL (ref 1.7–8.2)
NEUTS SEG NFR BLD: 55.6 % (ref 43–78)
NRBC # BLD: 0 K/UL (ref 0–0.2)
PLATELET # BLD AUTO: 112 K/UL (ref 150–450)
PMV BLD AUTO: 10.3 FL (ref 9.4–12.3)
RBC # BLD AUTO: 5.12 M/UL (ref 4.05–5.2)
WBC # BLD AUTO: 6.7 K/UL (ref 4.3–11.1)

## 2025-06-12 ENCOUNTER — RESULTS FOLLOW-UP (OUTPATIENT)
Dept: PRIMARY CARE CLINIC | Facility: CLINIC | Age: 89
End: 2025-06-12